# Patient Record
Sex: MALE | Race: BLACK OR AFRICAN AMERICAN | NOT HISPANIC OR LATINO | Employment: OTHER | URBAN - METROPOLITAN AREA
[De-identification: names, ages, dates, MRNs, and addresses within clinical notes are randomized per-mention and may not be internally consistent; named-entity substitution may affect disease eponyms.]

---

## 2023-03-09 DIAGNOSIS — M10.9 PODAGRA: ICD-10-CM

## 2023-03-09 RX ORDER — PROBENECID AND COLCHICINE .5; 5 MG/1; MG/1
TABLET ORAL
COMMUNITY
End: 2023-03-09 | Stop reason: SDUPTHER

## 2023-03-09 RX ORDER — PROBENECID AND COLCHICINE .5; 5 MG/1; MG/1
TABLET ORAL
Qty: 90 TABLET | Refills: 0 | Status: SHIPPED | OUTPATIENT
Start: 2023-03-09 | End: 2023-03-10

## 2023-03-10 DIAGNOSIS — M10.9 PODAGRA: ICD-10-CM

## 2023-03-10 RX ORDER — PROBENECID AND COLCHICINE .5; 5 MG/1; MG/1
TABLET ORAL
Qty: 90 TABLET | Refills: 0 | Status: SHIPPED | OUTPATIENT
Start: 2023-03-10 | End: 2023-06-09

## 2023-03-11 ENCOUNTER — TELEPHONE (OUTPATIENT)
Dept: PRIMARY CARE | Facility: CLINIC | Age: 78
End: 2023-03-11
Payer: MEDICARE

## 2023-03-11 DIAGNOSIS — I10 HYPERTENSION, UNSPECIFIED TYPE: ICD-10-CM

## 2023-03-11 RX ORDER — POTASSIUM CHLORIDE 20 MEQ/1
20 TABLET, EXTENDED RELEASE ORAL 3 TIMES DAILY
Qty: 90 TABLET | Refills: 1 | Status: SHIPPED | OUTPATIENT
Start: 2023-03-11 | End: 2023-06-06

## 2023-03-11 RX ORDER — POTASSIUM CHLORIDE 20 MEQ/1
1 TABLET, EXTENDED RELEASE ORAL 3 TIMES DAILY
COMMUNITY
Start: 2013-08-16 | End: 2023-03-11 | Stop reason: SDUPTHER

## 2023-04-17 ENCOUNTER — APPOINTMENT (OUTPATIENT)
Dept: PRIMARY CARE | Facility: CLINIC | Age: 78
End: 2023-04-17
Payer: MEDICARE

## 2023-05-09 DIAGNOSIS — I10 PRIMARY HYPERTENSION: ICD-10-CM

## 2023-05-09 RX ORDER — METOPROLOL SUCCINATE 200 MG/1
TABLET, EXTENDED RELEASE ORAL
Qty: 90 TABLET | Refills: 0 | OUTPATIENT
Start: 2023-05-09

## 2023-05-17 ENCOUNTER — OFFICE VISIT (OUTPATIENT)
Dept: PRIMARY CARE | Facility: CLINIC | Age: 78
End: 2023-05-17
Payer: MEDICARE

## 2023-05-17 ENCOUNTER — LAB (OUTPATIENT)
Dept: LAB | Facility: LAB | Age: 78
End: 2023-05-17
Payer: MEDICARE

## 2023-05-17 VITALS
HEIGHT: 72 IN | BODY MASS INDEX: 28.85 KG/M2 | HEART RATE: 40 BPM | OXYGEN SATURATION: 98 % | WEIGHT: 213 LBS | DIASTOLIC BLOOD PRESSURE: 66 MMHG | SYSTOLIC BLOOD PRESSURE: 128 MMHG

## 2023-05-17 DIAGNOSIS — N18.32 CKD STAGE G3B/A1, GFR 30-44 AND ALBUMIN CREATININE RATIO <30 MG/G (MULTI): ICD-10-CM

## 2023-05-17 DIAGNOSIS — N40.1 BPH WITH OBSTRUCTION/LOWER URINARY TRACT SYMPTOMS: ICD-10-CM

## 2023-05-17 DIAGNOSIS — R97.20 ELEVATED PSA: ICD-10-CM

## 2023-05-17 DIAGNOSIS — E83.52 HYPERCALCEMIA: ICD-10-CM

## 2023-05-17 DIAGNOSIS — I63.9 CEREBROVASCULAR ACCIDENT (CVA), UNSPECIFIED MECHANISM (MULTI): ICD-10-CM

## 2023-05-17 DIAGNOSIS — N13.8 BPH WITH OBSTRUCTION/LOWER URINARY TRACT SYMPTOMS: ICD-10-CM

## 2023-05-17 DIAGNOSIS — I10 PRIMARY HYPERTENSION: ICD-10-CM

## 2023-05-17 DIAGNOSIS — Z00.00 ROUTINE GENERAL MEDICAL EXAMINATION AT HEALTH CARE FACILITY: ICD-10-CM

## 2023-05-17 DIAGNOSIS — Z00.00 ROUTINE GENERAL MEDICAL EXAMINATION AT HEALTH CARE FACILITY: Primary | ICD-10-CM

## 2023-05-17 PROBLEM — M10.9 GOUT: Status: ACTIVE | Noted: 2023-05-17

## 2023-05-17 PROBLEM — E78.2 COMBINED HYPERLIPIDEMIA: Status: ACTIVE | Noted: 2023-05-17

## 2023-05-17 PROBLEM — H91.90 HEARING LOSS: Status: ACTIVE | Noted: 2023-05-17

## 2023-05-17 LAB
ALBUMIN (MG/L) IN URINE: 7 MG/L
ALBUMIN/CREATININE (UG/MG) IN URINE: 4.4 UG/MG CRT (ref 0–30)
CREATININE (MG/DL) IN URINE: 158 MG/DL (ref 20–370)
PARATHYRIN INTACT (PG/ML) IN SER/PLAS: 265.5 PG/ML (ref 18.5–88)
POC CALCIUM IONIZED (MMOL/L) IN BLOOD: 1.42 MMOL/L (ref 1.1–1.33)

## 2023-05-17 PROCEDURE — 80061 LIPID PANEL: CPT

## 2023-05-17 PROCEDURE — 85027 COMPLETE CBC AUTOMATED: CPT

## 2023-05-17 PROCEDURE — 3074F SYST BP LT 130 MM HG: CPT | Performed by: INTERNAL MEDICINE

## 2023-05-17 PROCEDURE — 80053 COMPREHEN METABOLIC PANEL: CPT

## 2023-05-17 PROCEDURE — 83970 ASSAY OF PARATHORMONE: CPT

## 2023-05-17 PROCEDURE — 82330 ASSAY OF CALCIUM: CPT

## 2023-05-17 PROCEDURE — 99213 OFFICE O/P EST LOW 20 MIN: CPT | Performed by: INTERNAL MEDICINE

## 2023-05-17 PROCEDURE — 82570 ASSAY OF URINE CREATININE: CPT

## 2023-05-17 PROCEDURE — 84153 ASSAY OF PSA TOTAL: CPT

## 2023-05-17 PROCEDURE — 3078F DIAST BP <80 MM HG: CPT | Performed by: INTERNAL MEDICINE

## 2023-05-17 PROCEDURE — G0439 PPPS, SUBSEQ VISIT: HCPCS | Performed by: INTERNAL MEDICINE

## 2023-05-17 PROCEDURE — 82043 UR ALBUMIN QUANTITATIVE: CPT

## 2023-05-17 PROCEDURE — 1160F RVW MEDS BY RX/DR IN RCRD: CPT | Performed by: INTERNAL MEDICINE

## 2023-05-17 PROCEDURE — 1170F FXNL STATUS ASSESSED: CPT | Performed by: INTERNAL MEDICINE

## 2023-05-17 PROCEDURE — 1159F MED LIST DOCD IN RCRD: CPT | Performed by: INTERNAL MEDICINE

## 2023-05-17 PROCEDURE — 36415 COLL VENOUS BLD VENIPUNCTURE: CPT

## 2023-05-17 PROCEDURE — 1036F TOBACCO NON-USER: CPT | Performed by: INTERNAL MEDICINE

## 2023-05-17 PROCEDURE — 84550 ASSAY OF BLOOD/URIC ACID: CPT

## 2023-05-17 RX ORDER — AMLODIPINE BESYLATE 10 MG/1
10 TABLET ORAL DAILY
COMMUNITY
End: 2023-05-23

## 2023-05-17 RX ORDER — LOSARTAN POTASSIUM 100 MG/1
100 TABLET ORAL DAILY
COMMUNITY
End: 2023-06-06

## 2023-05-17 RX ORDER — ALENDRONATE SODIUM 70 MG/1
70 TABLET ORAL
Qty: 4 TABLET | Refills: 11 | Status: SHIPPED | OUTPATIENT
Start: 2023-05-17 | End: 2024-05-16

## 2023-05-17 RX ORDER — FUROSEMIDE 20 MG/1
20 TABLET ORAL DAILY
Qty: 30 TABLET | Refills: 11 | Status: SHIPPED | OUTPATIENT
Start: 2023-05-17 | End: 2024-04-30

## 2023-05-17 RX ORDER — ASPIRIN 81 MG/1
1 TABLET ORAL DAILY
COMMUNITY
Start: 2014-01-17

## 2023-05-17 RX ORDER — CLONIDINE HYDROCHLORIDE 0.2 MG/1
0.2 TABLET ORAL EVERY 12 HOURS
COMMUNITY
End: 2023-06-06

## 2023-05-17 RX ORDER — HYDROCHLOROTHIAZIDE 50 MG/1
50 TABLET ORAL DAILY
COMMUNITY
End: 2023-05-17 | Stop reason: ALTCHOICE

## 2023-05-17 ASSESSMENT — PATIENT HEALTH QUESTIONNAIRE - PHQ9
SUM OF ALL RESPONSES TO PHQ9 QUESTIONS 1 AND 2: 0
2. FEELING DOWN, DEPRESSED OR HOPELESS: NOT AT ALL
1. LITTLE INTEREST OR PLEASURE IN DOING THINGS: NOT AT ALL
2. FEELING DOWN, DEPRESSED OR HOPELESS: NOT AT ALL
SUM OF ALL RESPONSES TO PHQ9 QUESTIONS 1 AND 2: 0
1. LITTLE INTEREST OR PLEASURE IN DOING THINGS: NOT AT ALL

## 2023-05-17 ASSESSMENT — ENCOUNTER SYMPTOMS
NUMBNESS: 0
FACIAL ASYMMETRY: 0
CONFUSION: 0
POLYPHAGIA: 0
NERVOUS/ANXIOUS: 0
EYE DISCHARGE: 0
AGITATION: 0
HYPERACTIVE: 0
BLOOD IN STOOL: 0
CHOKING: 0
FACIAL SWELLING: 0
SHORTNESS OF BREATH: 0
POLYDIPSIA: 0
EYE PAIN: 0
DIAPHORESIS: 0
JOINT SWELLING: 0
COUGH: 0
NAUSEA: 0
HEADACHES: 0
ACTIVITY CHANGE: 0
ABDOMINAL PAIN: 0
DYSPHORIC MOOD: 0
EYE ITCHING: 0
VOMITING: 0
SPEECH DIFFICULTY: 0
DIFFICULTY URINATING: 0
LIGHT-HEADEDNESS: 0
BACK PAIN: 0
EYE REDNESS: 0
FEVER: 0
CHEST TIGHTNESS: 0
APPETITE CHANGE: 0
FATIGUE: 0
ARTHRALGIAS: 0
ABDOMINAL DISTENTION: 0

## 2023-05-17 ASSESSMENT — ACTIVITIES OF DAILY LIVING (ADL)
BATHING: INDEPENDENT
MANAGING_FINANCES: INDEPENDENT
DRESSING: INDEPENDENT
TAKING_MEDICATION: INDEPENDENT
GROCERY_SHOPPING: INDEPENDENT
DOING_HOUSEWORK: INDEPENDENT

## 2023-05-17 NOTE — PROGRESS NOTES
Subjective   Reason for Visit: Huseyin Saunders is an 78 y.o. male here for a Medicare Wellness visit.               HPI    Patient is a 78-year-old male with past medical history of BPH elevated PSA chronic kidney disease stage III hypertension external vascular accident dyslipidemia and likely primary hyperparathyroidism who presents for Medicare wellness.    With regards to his medical care patient is happy with where he is at and does not wish to escalate care.  He is aware of the fact that his calcium has been elevated historically and he has been referred to an endocrinologist for evaluation.  He may need surgical excision of his parathyroid glands however he is deferring that at this time.  On arrival he is bradycardic at 35-40 although he is currently asymptomatic.  He denies any lightheadedness or dizziness.  No recent falls.  He is not on any beta-blocker .    With regards to his gout he has had no recent gout flares.    BPH due for PSA recheck.  He is not on any BPH medications.    He lives at home with his wife.  He does not wish to escalate care at this    Patient Care Team:  Kenji Morris DO as PCP - General  Kenji Morris DO as PCP [facility]     Review of Systems   Constitutional:  Negative for activity change, appetite change, diaphoresis, fatigue and fever.   HENT:  Negative for dental problem, drooling, ear pain, facial swelling, hearing loss and nosebleeds.    Eyes:  Negative for pain, discharge, redness and itching.   Respiratory:  Negative for cough, choking, chest tightness and shortness of breath.    Gastrointestinal:  Negative for abdominal distention, abdominal pain, blood in stool, nausea and vomiting.   Endocrine: Negative for cold intolerance, heat intolerance, polydipsia and polyphagia.   Genitourinary:  Negative for difficulty urinating.   Musculoskeletal:  Negative for arthralgias, back pain and joint swelling.   Allergic/Immunologic: Negative for environmental  allergies and food allergies.   Neurological:  Negative for facial asymmetry, speech difficulty, light-headedness, numbness and headaches.   Psychiatric/Behavioral:  Negative for agitation, confusion and dysphoric mood. The patient is not nervous/anxious and is not hyperactive.        Objective   Vitals:  /66   Pulse (!) 40   Ht 1.829 m (6')   Wt 96.6 kg (213 lb)   SpO2 98%   BMI 28.89 kg/m²       Physical Exam  Constitutional:       Appearance: Normal appearance.   HENT:      Head: Normocephalic and atraumatic.      Nose: No congestion or rhinorrhea.      Mouth/Throat:      Mouth: Mucous membranes are moist.   Eyes:      Pupils: Pupils are equal, round, and reactive to light.   Cardiovascular:      Rate and Rhythm: Normal rate and regular rhythm.      Pulses: Normal pulses.      Heart sounds: Normal heart sounds. No murmur heard.     No friction rub. No gallop.   Pulmonary:      Effort: Pulmonary effort is normal. No respiratory distress.      Breath sounds: Normal breath sounds. No stridor. No wheezing or rales.   Abdominal:      General: Abdomen is flat. Bowel sounds are normal. There is no distension.      Palpations: Abdomen is soft. There is no mass.      Tenderness: There is no guarding.      Hernia: No hernia is present.   Musculoskeletal:         General: No swelling.      Cervical back: No rigidity.      Right lower leg: No edema.      Left lower leg: No edema.      Comments: Weakness of the left lower extremity in the left upper extremity.  Walking with a cane   Skin:     General: Skin is warm and dry.   Neurological:      General: No focal deficit present.      Mental Status: He is alert and oriented to person, place, and time. Mental status is at baseline.      Motor: No weakness.      Gait: Gait normal.   Psychiatric:         Mood and Affect: Mood normal.         Behavior: Behavior normal.         Thought Content: Thought content normal.         Assessment/Plan   Problem List Items Addressed  This Visit          Nervous    Cerebrovascular accident (CVA) (CMS/HCC)    Current Assessment & Plan     Continue with aspirin  Deferring statin  Check labs including LDL fasting         Relevant Medications    furosemide (Lasix) 20 mg tablet    Other Relevant Orders    CBC    Comprehensive metabolic panel    Lipid Panel    PSA    Calcium, ionized    PTH, intact    Albumin , Urine Random    Uric acid       Circulatory    Hypertension    Current Assessment & Plan     Well-controlled on current medications.  No medication adjustment at this time         Relevant Medications    furosemide (Lasix) 20 mg tablet    Other Relevant Orders    CBC    Comprehensive metabolic panel    Lipid Panel    PSA    Calcium, ionized    PTH, intact    Albumin , Urine Random    Uric acid       Genitourinary    CKD stage G3b/A1, GFR 30-44 and albumin creatinine ratio <30 mg/g    Current Assessment & Plan     Check renal function.  Continue ARB.  Change HCTZ to Lasix           Relevant Medications    furosemide (Lasix) 20 mg tablet    Other Relevant Orders    CBC    Comprehensive metabolic panel    Lipid Panel    PSA    Calcium, ionized    PTH, intact    Albumin , Urine Random    Uric acid    BPH with obstruction/lower urinary tract symptoms    Current Assessment & Plan     No LUTS  Check PSA         Relevant Medications    furosemide (Lasix) 20 mg tablet    Other Relevant Orders    CBC    Comprehensive metabolic panel    Lipid Panel    PSA    Calcium, ionized    PTH, intact    Albumin , Urine Random    Uric acid    Elevated PSA    Current Assessment & Plan     Check PSA         Relevant Medications    furosemide (Lasix) 20 mg tablet    Other Relevant Orders    CBC    Comprehensive metabolic panel    Lipid Panel    PSA    Calcium, ionized    PTH, intact    Albumin , Urine Random    Uric acid       Other    Hypercalcemia    Current Assessment & Plan     Change HCTZ for Lasix  EKG shows significant PVC burden  Advised to establish with  endocrinology to discuss primary hyperparathyroidism in the setting of elevated PTH and calcium; patient refusing  Patient is aware that untreated hyper calcium Edelmira can be dangerous and could possibly cause arrhythmias.  Patient does not wish to seek treatment at this time does not want to see endocrinology or ENT for surgical evaluation  Patient is of sound mind and understands his decision could result in death if his calcium levels elevate to a point to causing         Relevant Medications    furosemide (Lasix) 20 mg tablet    alendronate (Fosamax) 70 mg tablet    Other Relevant Orders    CBC    Comprehensive metabolic panel    Lipid Panel    PSA    Calcium, ionized    PTH, intact    Albumin , Urine Random    Uric acid    Referral to Endocrinology     Other Visit Diagnoses       Routine general medical examination at health care facility    -  Primary    Relevant Medications    furosemide (Lasix) 20 mg tablet    Other Relevant Orders    1 Year Follow Up In [facility] - PCP - Wellness Exam    CBC    Comprehensive metabolic panel    Lipid Panel    PSA    Calcium, ionized    PTH, intact    Albumin , Urine Random    Uric acid

## 2023-05-17 NOTE — ASSESSMENT & PLAN NOTE
Change HCTZ for Lasix  EKG shows significant PVC burden  Advised to establish with endocrinology to discuss primary hyperparathyroidism in the setting of elevated PTH and calcium; patient refusing  Patient is aware that untreated hyper calcium Edelmira can be dangerous and could possibly cause arrhythmias.  Patient does not wish to seek treatment at this time does not want to see endocrinology or ENT for surgical evaluation  Patient is of sound mind and understands his decision could result in death if his calcium levels elevate to a point to causing

## 2023-05-18 LAB
ALANINE AMINOTRANSFERASE (SGPT) (U/L) IN SER/PLAS: 12 U/L (ref 10–52)
ALBUMIN (G/DL) IN SER/PLAS: 4.2 G/DL (ref 3.4–5)
ALKALINE PHOSPHATASE (U/L) IN SER/PLAS: 58 U/L (ref 33–136)
ANION GAP IN SER/PLAS: 15 MMOL/L (ref 10–20)
ASPARTATE AMINOTRANSFERASE (SGOT) (U/L) IN SER/PLAS: 15 U/L (ref 9–39)
BILIRUBIN TOTAL (MG/DL) IN SER/PLAS: 1.1 MG/DL (ref 0–1.2)
CALCIUM (MG/DL) IN SER/PLAS: 11.4 MG/DL (ref 8.6–10.6)
CARBON DIOXIDE, TOTAL (MMOL/L) IN SER/PLAS: 26 MMOL/L (ref 21–32)
CHLORIDE (MMOL/L) IN SER/PLAS: 101 MMOL/L (ref 98–107)
CHOLESTEROL (MG/DL) IN SER/PLAS: 161 MG/DL (ref 0–199)
CHOLESTEROL IN HDL (MG/DL) IN SER/PLAS: 31.4 MG/DL
CHOLESTEROL/HDL RATIO: 5.1
CREATININE (MG/DL) IN SER/PLAS: 1.53 MG/DL (ref 0.5–1.3)
ERYTHROCYTE DISTRIBUTION WIDTH (RATIO) BY AUTOMATED COUNT: 12.9 % (ref 11.5–14.5)
ERYTHROCYTE MEAN CORPUSCULAR HEMOGLOBIN CONCENTRATION (G/DL) BY AUTOMATED: 33.5 G/DL (ref 32–36)
ERYTHROCYTE MEAN CORPUSCULAR VOLUME (FL) BY AUTOMATED COUNT: 88 FL (ref 80–100)
ERYTHROCYTES (10*6/UL) IN BLOOD BY AUTOMATED COUNT: 5.46 X10E12/L (ref 4.5–5.9)
GFR MALE: 47 ML/MIN/1.73M2
GLUCOSE (MG/DL) IN SER/PLAS: 115 MG/DL (ref 74–99)
HEMATOCRIT (%) IN BLOOD BY AUTOMATED COUNT: 48.3 % (ref 41–52)
HEMOGLOBIN (G/DL) IN BLOOD: 16.2 G/DL (ref 13.5–17.5)
LDL: 90 MG/DL (ref 0–99)
LEUKOCYTES (10*3/UL) IN BLOOD BY AUTOMATED COUNT: 6.8 X10E9/L (ref 4.4–11.3)
NON HDL CHOLESTEROL: 130 MG/DL
NRBC (PER 100 WBCS) BY AUTOMATED COUNT: 0 /100 WBC (ref 0–0)
PLATELETS (10*3/UL) IN BLOOD AUTOMATED COUNT: 209 X10E9/L (ref 150–450)
POTASSIUM (MMOL/L) IN SER/PLAS: 4 MMOL/L (ref 3.5–5.3)
PROSTATE SPECIFIC AG (NG/ML) IN SER/PLAS: 7.97 NG/ML (ref 0–4)
PROTEIN TOTAL: 6.8 G/DL (ref 6.4–8.2)
SODIUM (MMOL/L) IN SER/PLAS: 138 MMOL/L (ref 136–145)
TRIGLYCERIDE (MG/DL) IN SER/PLAS: 200 MG/DL (ref 0–149)
URATE (MG/DL) IN SER/PLAS: 6.8 MG/DL (ref 4–7.5)
UREA NITROGEN (MG/DL) IN SER/PLAS: 24 MG/DL (ref 6–23)
VLDL: 40 MG/DL (ref 0–40)

## 2023-05-22 DIAGNOSIS — I10 PRIMARY HYPERTENSION: ICD-10-CM

## 2023-05-23 RX ORDER — AMLODIPINE BESYLATE 10 MG/1
10 TABLET ORAL DAILY
Qty: 90 TABLET | Refills: 1 | Status: SHIPPED | OUTPATIENT
Start: 2023-05-23 | End: 2023-11-14

## 2023-06-05 DIAGNOSIS — I10 HYPERTENSION, UNSPECIFIED TYPE: ICD-10-CM

## 2023-06-06 RX ORDER — LOSARTAN POTASSIUM 100 MG/1
TABLET ORAL
Qty: 90 TABLET | Refills: 0 | Status: SHIPPED | OUTPATIENT
Start: 2023-06-06 | End: 2023-09-18

## 2023-06-06 RX ORDER — POTASSIUM CHLORIDE 20 MEQ/1
TABLET, EXTENDED RELEASE ORAL
Qty: 90 TABLET | Refills: 5 | Status: SHIPPED | OUTPATIENT
Start: 2023-06-06 | End: 2023-12-04

## 2023-06-06 RX ORDER — CLONIDINE HYDROCHLORIDE 0.2 MG/1
TABLET ORAL
Qty: 180 TABLET | Refills: 0 | Status: SHIPPED | OUTPATIENT
Start: 2023-06-06 | End: 2023-06-09

## 2023-06-07 DIAGNOSIS — I10 HYPERTENSION, UNSPECIFIED TYPE: ICD-10-CM

## 2023-06-07 DIAGNOSIS — M10.9 PODAGRA: ICD-10-CM

## 2023-06-09 RX ORDER — CLONIDINE HYDROCHLORIDE 0.2 MG/1
TABLET ORAL
Qty: 180 TABLET | Refills: 0 | Status: SHIPPED | OUTPATIENT
Start: 2023-06-09 | End: 2023-09-06

## 2023-06-09 RX ORDER — PROBENECID AND COLCHICINE .5; 5 MG/1; MG/1
TABLET ORAL
Qty: 90 TABLET | Refills: 0 | Status: SHIPPED | OUTPATIENT
Start: 2023-06-09 | End: 2023-08-31 | Stop reason: SDUPTHER

## 2023-07-28 DIAGNOSIS — I10 PRIMARY HYPERTENSION: ICD-10-CM

## 2023-07-30 RX ORDER — METOPROLOL SUCCINATE 200 MG/1
TABLET, EXTENDED RELEASE ORAL
Qty: 90 TABLET | Refills: 0 | Status: SHIPPED | OUTPATIENT
Start: 2023-07-30 | End: 2023-10-24

## 2023-08-31 DIAGNOSIS — M10.9 PODAGRA: ICD-10-CM

## 2023-09-01 RX ORDER — PROBENECID AND COLCHICINE .5; 5 MG/1; MG/1
1 TABLET ORAL DAILY
Qty: 90 TABLET | Refills: 1 | Status: SHIPPED | OUTPATIENT
Start: 2023-09-01 | End: 2024-03-01 | Stop reason: SDUPTHER

## 2023-09-05 DIAGNOSIS — I10 HYPERTENSION, UNSPECIFIED TYPE: ICD-10-CM

## 2023-09-06 DIAGNOSIS — I10 HYPERTENSION, UNSPECIFIED TYPE: ICD-10-CM

## 2023-09-06 RX ORDER — CLONIDINE HYDROCHLORIDE 0.2 MG/1
0.2 TABLET ORAL EVERY 12 HOURS
Qty: 180 TABLET | Refills: 0 | Status: SHIPPED | OUTPATIENT
Start: 2023-09-06 | End: 2023-09-06

## 2023-09-06 RX ORDER — CLONIDINE HYDROCHLORIDE 0.2 MG/1
0.2 TABLET ORAL EVERY 12 HOURS
Qty: 180 TABLET | Refills: 0 | Status: SHIPPED | OUTPATIENT
Start: 2023-09-06 | End: 2023-11-30 | Stop reason: SDUPTHER

## 2023-09-18 DIAGNOSIS — I10 HYPERTENSION, UNSPECIFIED TYPE: ICD-10-CM

## 2023-09-18 RX ORDER — LOSARTAN POTASSIUM 100 MG/1
TABLET ORAL
Qty: 90 TABLET | Refills: 0 | Status: SHIPPED | OUTPATIENT
Start: 2023-09-18 | End: 2023-12-27

## 2023-09-22 DIAGNOSIS — I10 HYPERTENSION, UNSPECIFIED TYPE: ICD-10-CM

## 2023-09-23 RX ORDER — LOSARTAN POTASSIUM 100 MG/1
TABLET ORAL
Qty: 90 TABLET | Refills: 0 | OUTPATIENT
Start: 2023-09-23

## 2023-09-25 DIAGNOSIS — I10 HYPERTENSION, UNSPECIFIED TYPE: ICD-10-CM

## 2023-09-27 RX ORDER — LOSARTAN POTASSIUM 100 MG/1
TABLET ORAL
Qty: 90 TABLET | Refills: 0 | OUTPATIENT
Start: 2023-09-27

## 2023-10-23 DIAGNOSIS — I10 PRIMARY HYPERTENSION: ICD-10-CM

## 2023-10-24 RX ORDER — METOPROLOL SUCCINATE 200 MG/1
200 TABLET, EXTENDED RELEASE ORAL NIGHTLY
Qty: 90 TABLET | Refills: 0 | Status: SHIPPED | OUTPATIENT
Start: 2023-10-24 | End: 2024-01-25

## 2023-10-26 DIAGNOSIS — I10 PRIMARY HYPERTENSION: ICD-10-CM

## 2023-10-26 RX ORDER — METOPROLOL SUCCINATE 200 MG/1
200 TABLET, EXTENDED RELEASE ORAL NIGHTLY
Qty: 90 TABLET | Refills: 0 | OUTPATIENT
Start: 2023-10-26

## 2023-11-13 DIAGNOSIS — I10 PRIMARY HYPERTENSION: ICD-10-CM

## 2023-11-14 RX ORDER — AMLODIPINE BESYLATE 10 MG/1
10 TABLET ORAL DAILY
Qty: 90 TABLET | Refills: 0 | Status: SHIPPED | OUTPATIENT
Start: 2023-11-14 | End: 2024-02-15

## 2023-11-20 ENCOUNTER — APPOINTMENT (OUTPATIENT)
Dept: PRIMARY CARE | Facility: CLINIC | Age: 78
End: 2023-11-20
Payer: MEDICARE

## 2023-11-30 ENCOUNTER — LAB (OUTPATIENT)
Dept: LAB | Facility: LAB | Age: 78
End: 2023-11-30
Payer: MEDICARE

## 2023-11-30 ENCOUNTER — OFFICE VISIT (OUTPATIENT)
Dept: PRIMARY CARE | Facility: CLINIC | Age: 78
End: 2023-11-30
Payer: MEDICARE

## 2023-11-30 VITALS
SYSTOLIC BLOOD PRESSURE: 116 MMHG | HEART RATE: 62 BPM | DIASTOLIC BLOOD PRESSURE: 79 MMHG | WEIGHT: 210 LBS | BODY MASS INDEX: 28.48 KG/M2

## 2023-11-30 DIAGNOSIS — I10 HYPERTENSION, UNSPECIFIED TYPE: ICD-10-CM

## 2023-11-30 DIAGNOSIS — E78.2 COMBINED HYPERLIPIDEMIA: Primary | ICD-10-CM

## 2023-11-30 DIAGNOSIS — E83.52 HYPERCALCEMIA: ICD-10-CM

## 2023-11-30 DIAGNOSIS — Z00.00 ROUTINE GENERAL MEDICAL EXAMINATION AT HEALTH CARE FACILITY: ICD-10-CM

## 2023-11-30 DIAGNOSIS — E21.3 HYPERPARATHYROIDISM (MULTI): ICD-10-CM

## 2023-11-30 DIAGNOSIS — N18.32 CKD STAGE G3B/A1, GFR 30-44 AND ALBUMIN CREATININE RATIO <30 MG/G (MULTI): ICD-10-CM

## 2023-11-30 DIAGNOSIS — I69.351 HEMIPLEGIA AND HEMIPARESIS FOLLOWING CEREBRAL INFARCTION AFFECTING RIGHT DOMINANT SIDE (MULTI): ICD-10-CM

## 2023-11-30 DIAGNOSIS — I10 PRIMARY HYPERTENSION: ICD-10-CM

## 2023-11-30 DIAGNOSIS — R97.20 ELEVATED PSA: ICD-10-CM

## 2023-11-30 PROBLEM — I63.9 CEREBROVASCULAR ACCIDENT (CVA) (MULTI): Status: RESOLVED | Noted: 2023-05-17 | Resolved: 2023-11-30

## 2023-11-30 PROBLEM — N18.31 BENIGN HYPERTENSION WITH STAGE 3A CHRONIC KIDNEY DISEASE (MULTI): Status: ACTIVE | Noted: 2023-11-30

## 2023-11-30 PROBLEM — I12.9 BENIGN HYPERTENSION WITH STAGE 3A CHRONIC KIDNEY DISEASE (MULTI): Status: ACTIVE | Noted: 2023-11-30

## 2023-11-30 LAB
CA-I BLD-SCNC: 1.42 MMOL/L (ref 1.1–1.33)
PSA SERPL-MCNC: 8.12 NG/ML
PTH-INTACT SERPL-MCNC: 263.2 PG/ML (ref 18.5–88)

## 2023-11-30 PROCEDURE — 84153 ASSAY OF PSA TOTAL: CPT

## 2023-11-30 PROCEDURE — 1036F TOBACCO NON-USER: CPT | Performed by: INTERNAL MEDICINE

## 2023-11-30 PROCEDURE — 1160F RVW MEDS BY RX/DR IN RCRD: CPT | Performed by: INTERNAL MEDICINE

## 2023-11-30 PROCEDURE — 3078F DIAST BP <80 MM HG: CPT | Performed by: INTERNAL MEDICINE

## 2023-11-30 PROCEDURE — 82330 ASSAY OF CALCIUM: CPT

## 2023-11-30 PROCEDURE — 36415 COLL VENOUS BLD VENIPUNCTURE: CPT

## 2023-11-30 PROCEDURE — 1159F MED LIST DOCD IN RCRD: CPT | Performed by: INTERNAL MEDICINE

## 2023-11-30 PROCEDURE — 83970 ASSAY OF PARATHORMONE: CPT

## 2023-11-30 PROCEDURE — 99214 OFFICE O/P EST MOD 30 MIN: CPT | Performed by: INTERNAL MEDICINE

## 2023-11-30 PROCEDURE — 3074F SYST BP LT 130 MM HG: CPT | Performed by: INTERNAL MEDICINE

## 2023-11-30 RX ORDER — CLONIDINE HYDROCHLORIDE 0.2 MG/1
0.2 TABLET ORAL EVERY 12 HOURS
Qty: 180 TABLET | Refills: 1 | Status: SHIPPED | OUTPATIENT
Start: 2023-11-30 | End: 2024-05-15

## 2023-11-30 ASSESSMENT — ENCOUNTER SYMPTOMS
SPEECH DIFFICULTY: 0
POLYDIPSIA: 0
ARTHRALGIAS: 0
POLYPHAGIA: 0
NERVOUS/ANXIOUS: 0
BACK PAIN: 0
SHORTNESS OF BREATH: 0
HEADACHES: 0
CHEST TIGHTNESS: 0
CONFUSION: 0
CHOKING: 0
FEVER: 0
ABDOMINAL DISTENTION: 0
BLOOD IN STOOL: 0
FACIAL ASYMMETRY: 0
EYE PAIN: 0
FATIGUE: 0
COUGH: 0
HYPERACTIVE: 0
JOINT SWELLING: 0
NAUSEA: 0
NUMBNESS: 0
FACIAL SWELLING: 0
EYE REDNESS: 0
ABDOMINAL PAIN: 0
APPETITE CHANGE: 0
AGITATION: 0
DIAPHORESIS: 0
EYE ITCHING: 0
VOMITING: 0
LIGHT-HEADEDNESS: 0
DYSPHORIC MOOD: 0
ACTIVITY CHANGE: 0
EYE DISCHARGE: 0
DIFFICULTY URINATING: 0

## 2023-11-30 NOTE — ASSESSMENT & PLAN NOTE
Check PTH and ionized calcium.  Deferring referral to ENT or endocrinology.  Continue alendronate and Lasix

## 2023-12-01 RX ORDER — CLONIDINE HYDROCHLORIDE 0.2 MG/1
0.2 TABLET ORAL EVERY 12 HOURS
Qty: 180 TABLET | Refills: 0 | OUTPATIENT
Start: 2023-12-01

## 2023-12-01 NOTE — RESULT ENCOUNTER NOTE
As discussed previously your PTH and calcium are elevated and I do recommend either seeing an endocrinologist or an ear nose and throat specialist.  If you change your mind please let me know so we can place the referral.  Your prostate-specific antigen is stable at 8.12.  We will continue to check every 6 months.  If it continues to rise we may need to see a urologist.

## 2023-12-02 DIAGNOSIS — I10 HYPERTENSION, UNSPECIFIED TYPE: ICD-10-CM

## 2023-12-04 RX ORDER — POTASSIUM CHLORIDE 20 MEQ/1
TABLET, EXTENDED RELEASE ORAL
Qty: 90 TABLET | Refills: 5 | Status: SHIPPED | OUTPATIENT
Start: 2023-12-04 | End: 2024-06-04

## 2023-12-26 DIAGNOSIS — I10 HYPERTENSION, UNSPECIFIED TYPE: ICD-10-CM

## 2023-12-27 RX ORDER — LOSARTAN POTASSIUM 100 MG/1
TABLET ORAL
Qty: 90 TABLET | Refills: 0 | Status: SHIPPED | OUTPATIENT
Start: 2023-12-27 | End: 2024-03-19

## 2023-12-29 DIAGNOSIS — I10 HYPERTENSION, UNSPECIFIED TYPE: ICD-10-CM

## 2024-01-02 RX ORDER — LOSARTAN POTASSIUM 100 MG/1
TABLET ORAL
Qty: 90 TABLET | Refills: 0 | OUTPATIENT
Start: 2024-01-02

## 2024-01-09 ENCOUNTER — APPOINTMENT (OUTPATIENT)
Dept: RADIOLOGY | Facility: HOSPITAL | Age: 79
End: 2024-01-09
Payer: MEDICARE

## 2024-01-09 ENCOUNTER — HOSPITAL ENCOUNTER (EMERGENCY)
Facility: HOSPITAL | Age: 79
Discharge: HOME | End: 2024-01-09
Payer: MEDICARE

## 2024-01-09 VITALS
BODY MASS INDEX: 32.96 KG/M2 | OXYGEN SATURATION: 100 % | HEART RATE: 49 BPM | WEIGHT: 210 LBS | SYSTOLIC BLOOD PRESSURE: 145 MMHG | TEMPERATURE: 97.9 F | HEIGHT: 67 IN | DIASTOLIC BLOOD PRESSURE: 95 MMHG | RESPIRATION RATE: 16 BRPM

## 2024-01-09 DIAGNOSIS — M79.605 PAIN OF LEFT LOWER EXTREMITY: Primary | ICD-10-CM

## 2024-01-09 PROCEDURE — 2500000004 HC RX 250 GENERAL PHARMACY W/ HCPCS (ALT 636 FOR OP/ED): Performed by: PHYSICIAN ASSISTANT

## 2024-01-09 PROCEDURE — 73564 X-RAY EXAM KNEE 4 OR MORE: CPT | Mod: LT

## 2024-01-09 PROCEDURE — 99284 EMERGENCY DEPT VISIT MOD MDM: CPT

## 2024-01-09 PROCEDURE — 99285 EMERGENCY DEPT VISIT HI MDM: CPT | Performed by: PHYSICIAN ASSISTANT

## 2024-01-09 PROCEDURE — 73590 X-RAY EXAM OF LOWER LEG: CPT | Mod: LT

## 2024-01-09 PROCEDURE — 73564 X-RAY EXAM KNEE 4 OR MORE: CPT | Mod: LEFT SIDE | Performed by: RADIOLOGY

## 2024-01-09 PROCEDURE — 73590 X-RAY EXAM OF LOWER LEG: CPT | Mod: LEFT SIDE | Performed by: RADIOLOGY

## 2024-01-09 PROCEDURE — 96372 THER/PROPH/DIAG INJ SC/IM: CPT | Performed by: PHYSICIAN ASSISTANT

## 2024-01-09 PROCEDURE — 73552 X-RAY EXAM OF FEMUR 2/>: CPT | Mod: LT

## 2024-01-09 PROCEDURE — 73552 X-RAY EXAM OF FEMUR 2/>: CPT | Mod: LEFT SIDE | Performed by: RADIOLOGY

## 2024-01-09 RX ORDER — KETOROLAC TROMETHAMINE 30 MG/ML
30 INJECTION, SOLUTION INTRAMUSCULAR; INTRAVENOUS ONCE
Status: COMPLETED | OUTPATIENT
Start: 2024-01-09 | End: 2024-01-09

## 2024-01-09 RX ORDER — DICLOFENAC SODIUM 10 MG/G
4 GEL TOPICAL 4 TIMES DAILY PRN
Qty: 100 G | Refills: 0 | Status: SHIPPED | OUTPATIENT
Start: 2024-01-09

## 2024-01-09 RX ORDER — ACETAMINOPHEN 500 MG
500-1000 TABLET ORAL EVERY 6 HOURS PRN
Qty: 30 TABLET | Refills: 0 | Status: SHIPPED | OUTPATIENT
Start: 2024-01-09 | End: 2024-01-19

## 2024-01-09 RX ADMIN — KETOROLAC TROMETHAMINE 30 MG: 30 INJECTION, SOLUTION INTRAMUSCULAR; INTRAVENOUS at 09:56

## 2024-01-09 ASSESSMENT — PAIN DESCRIPTION - LOCATION: LOCATION: LEG

## 2024-01-09 NOTE — ED PROVIDER NOTES
HPI   Chief Complaint   Patient presents with    Leg Pain       This is a 78-year-old male with a past medical history of hyperparathyroidism, hyperlipidemia, gout, hemiplegia/hemiparesis secondary to prior CVA, hypertension, CKD presents to the emergency department with concern for left leg pain.  States he normally walks with a cane, sat up on bed about a week ago and states the blanket slid down and he fell against his left side.  Same thing occurred today.  No head trauma both cases.  No loss of consciousness.  Is endorsing pain in the left upper and left lower leg that is mostly present when he gets up and ambulates.  Lives with his wife in a one-story building.  Denies associated chest pain, palpitations, shortness of breath, dizziness, lightheadedness, nausea, vomiting.              Anne Coma Scale Score: 15                  Patient History   Past Medical History:   Diagnosis Date    Personal history of other diseases of the circulatory system     History of essential hypertension     No past surgical history on file.  No family history on file.  Social History     Tobacco Use    Smoking status: Former     Types: Cigarettes     Passive exposure: Past    Smokeless tobacco: Never   Substance Use Topics    Alcohol use: Not Currently    Drug use: Never       Physical Exam   ED Triage Vitals [01/09/24 0901]   Temp Heart Rate Resp BP   36.6 °C (97.9 °F) (!) 49 16 (!) 145/95      SpO2 Temp src Heart Rate Source Patient Position   100 % -- -- --      BP Location FiO2 (%)     -- --       Physical Exam  Vitals and nursing note reviewed.   Constitutional:       Appearance: Normal appearance.   HENT:      Head: Normocephalic and atraumatic.   Cardiovascular:      Rate and Rhythm: Normal rate and regular rhythm.   Pulmonary:      Effort: Pulmonary effort is normal.      Breath sounds: Normal breath sounds.   Musculoskeletal:      Comments: Full range of motion of bilateral hips, knees, ankles, nontender, no swelling,  no erythema, neurovascularly intact   Skin:     General: Skin is warm and dry.   Neurological:      Mental Status: He is alert and oriented to person, place, and time.         ED Course & MDM   Diagnoses as of 01/09/24 1728   Pain of left lower extremity       Medical Decision Making  78-year-old male, is alert and oriented x 3, afebrile and hemodynamically stable.  Presenting with concern for left leg pain status post fall.    Examination with preserved motor, sensation, pulses, skin exam without evidence of any injuries, no edema.  The patient was able to stand independently, but had to hold onto the wheelchair and when it is unable to take a step.    The patient was given a dose of IM Toradol and an x-ray of the left leg including the femur, knee, tibia/fibula was performed.    X-ray without acute findings.  He does report improvement after Toradol.  Was able to stand and take a few steps, though continues to have some pain and weakness.  Low concerns for acute stroke, DVT, arterial thromboembolism given absence of swelling and with palpable pulses.    I offered the patient admission for PT/OT/possible placement versus initiation of home health aide/home PT versus discharge home with neither the above.  The patient and his wife were open to getting set up with home health aide/PT.    Social work was consulted, we put in a prescription for a rollator and order put in for home health aide/PT initiation.  The patient was discharged home, PCP follow-up advised.        Procedure  Procedures     Paulie Avila PA-C  01/09/24 1731

## 2024-01-09 NOTE — ED TRIAGE NOTES
Pt reports he fell a week ago and again this morning. States he has left leg pain and is unable to ambulate

## 2024-01-11 ENCOUNTER — HOME HEALTH ADMISSION (OUTPATIENT)
Dept: HOME HEALTH SERVICES | Facility: HOME HEALTH | Age: 79
End: 2024-01-11
Payer: MEDICARE

## 2024-01-11 ENCOUNTER — DOCUMENTATION (OUTPATIENT)
Dept: HOME HEALTH SERVICES | Facility: HOME HEALTH | Age: 79
End: 2024-01-11
Payer: MEDICARE

## 2024-01-11 ENCOUNTER — TELEPHONE (OUTPATIENT)
Dept: HOME HEALTH SERVICES | Facility: HOME HEALTH | Age: 79
End: 2024-01-11
Payer: MEDICARE

## 2024-01-11 PROBLEM — M79.605 PAIN OF LEFT LOWER EXTREMITY: Status: ACTIVE | Noted: 2024-01-11

## 2024-01-11 NOTE — TELEPHONE ENCOUNTER
Hello,    As discussed in secure chat- okay with DELAY SOC for 1/14/24 pending insurance verification/acceptance.    Thank you!     Betsey Farah LPN  [facility]  [facility]  [address] [place], [place] [id number] [phone]

## 2024-01-14 ENCOUNTER — HOME CARE VISIT (OUTPATIENT)
Dept: HOME HEALTH SERVICES | Facility: HOME HEALTH | Age: 79
End: 2024-01-14
Payer: MEDICARE

## 2024-01-14 VITALS
SYSTOLIC BLOOD PRESSURE: 152 MMHG | DIASTOLIC BLOOD PRESSURE: 97 MMHG | RESPIRATION RATE: 18 BRPM | OXYGEN SATURATION: 100 % | HEART RATE: 54 BPM | TEMPERATURE: 96.7 F

## 2024-01-14 PROCEDURE — G0151 HHCP-SERV OF PT,EA 15 MIN: HCPCS | Mod: HHH

## 2024-01-14 PROCEDURE — 1090000002 HH PPS REVENUE DEBIT

## 2024-01-14 PROCEDURE — 1090000001 HH PPS REVENUE CREDIT

## 2024-01-14 PROCEDURE — 169592 NO-PAY CLAIM PROCEDURE

## 2024-01-14 PROCEDURE — 0023 HH SOC

## 2024-01-14 SDOH — HEALTH STABILITY: PHYSICAL HEALTH: EXERCISE TYPE: SKILLLED THERAPEUTIC EXERCISES

## 2024-01-14 SDOH — HEALTH STABILITY: PHYSICAL HEALTH: EXERCISE ACTIVITY: GAIT TRAINING

## 2024-01-14 SDOH — HEALTH STABILITY: PHYSICAL HEALTH: PHYSICAL EXERCISE: 10

## 2024-01-14 SDOH — HEALTH STABILITY: PHYSICAL HEALTH: EXERCISE ACTIVITIES SETS: 2

## 2024-01-14 SDOH — HEALTH STABILITY: PHYSICAL HEALTH: RESISTANCE: AS TOLERATED

## 2024-01-14 SDOH — HEALTH STABILITY: PHYSICAL HEALTH: EXERCISE ACTIVITY: BALANCE TRAINING

## 2024-01-14 SDOH — HEALTH STABILITY: PHYSICAL HEALTH: PHYSICAL EXERCISE: SUPINE, SITTING, STANDING

## 2024-01-14 SDOH — HEALTH STABILITY: PHYSICAL HEALTH: EXERCISE ACTIVITY: SKILLLED THERAPEUTIC EXERCISES

## 2024-01-14 ASSESSMENT — ACTIVITIES OF DAILY LIVING (ADL)
AMBULATION ASSISTANCE: MODERATE ASSIST
AMBULATION ASSISTANCE: 1
ENTERING_EXITING_HOME: MODERATE ASSIST
AMBULATION ASSISTANCE: STAND BY ASSIST
AMBULATION_DISTANCE/DURATION_TOLERATED: 5 STEPS
PHYSICAL_TRANSFERS_DEVICES: BODY SUPPORT
CURRENT_FUNCTION: MODERATE ASSIST
AMBULATION ASSISTANCE ON FLAT SURFACES: 1
PHYSICAL TRANSFERS ASSESSED: 1
OASIS_M1830: 03

## 2024-01-14 ASSESSMENT — BALANCE ASSESSMENTS
IMMEDIATE STANDING BALANCE FIRST 5 SECONDS: 0 - UNSTEADY (STAGGERS, MOVES FEET, TRUNK SWAY)
ARISING SCORE: 1
EYES CLOSED AT MAXIMUM POSITION NUDGED: 0 - UNSTEADY
NUDGED: 0 - BEGINS TO FALL
ATTEMPTS TO ARISE: 1 - ABLE, REQUIRES MORE THAN ONE ATTEMPT
SITTING DOWN: 1 - USES ARMS OR NOT SMOOTH MOTION
TURNING 360 DEGREES STEPS: 0 - DISCONTINUOUS STEPS
ARISES: 1 - ABLE, USES ARMS TO HELP
BALANCE SCORE: 5
NUDGED SCORE: 0
STANDING BALANCE: 1 - STEADY BUT WIDE STANCE AND USES CANE OR OTHER SUPPORT
SITTING BALANCE: 1 - STEADY, SAFE

## 2024-01-14 ASSESSMENT — ENCOUNTER SYMPTOMS
PAIN LOCATION: LEFT LEG
ARTHRALGIAS: 1
PERSON REPORTING PAIN: PATIENT
LIMITED RANGE OF MOTION: 1
MUSCLE WEAKNESS: 1
SUBJECTIVE PAIN PROGRESSION: WAXING AND WANING
HYPERTENSION: 1
HIGHEST PAIN SEVERITY IN PAST 24 HOURS: 10/10
PAIN: 1
PAIN LOCATION - PAIN SEVERITY: 8/10
LOWEST PAIN SEVERITY IN PAST 24 HOURS: 6/10
PAIN SEVERITY GOAL: 2/10

## 2024-01-14 ASSESSMENT — GAIT ASSESSMENTS
GAIT SCORE: 3
TRUNK SCORE: 1
TRUNK: 1 - NO SWAY BUT FLEXION OF KNEES OR BACK OR SPREADS ARMS WHILE WALKING
BALANCE AND GAIT SCORE: 8
PATH: 1 - MILD/MODERATE DEVIATION OR USES WALKING AID
INITIATION OF GAIT IMMEDIATELY AFTER GO: 0 - ANY HESITANCY OR MULTIPLE ATTEMPTS TO START
WALKING STANCE: 0 - HEELS APART
STEP SYMMETRY: 1 - RIGHT AND LEFT STEP LENGTH APPEAR EQUAL
STEP CONTINUITY: 0 - STOPPING OR DISCONTINUITY BETWEEN STEPS
PATH SCORE: 1

## 2024-01-14 NOTE — HOME HEALTH
prlbme with hearing; visipoijn good. no dep.\    no memory issues    demonstarted antaligc gait pattern and not able to walk more thn 5 to 6 ssteps withoutg WW.

## 2024-01-14 NOTE — HOME HEALTH
\GOALS: PATIENT WILL DEMONSTRATE BETTER MOBILITY WITH NO FALLS.          SUBJECTIVE: THE PATIENT REPORTS HE HAD STROKE 10 YEARS AGO. HE USED TO USE CANE TO NAVIGATE HIS HOME. WEAKNESS IN LEFT LEG AND STATED THE LEFT GROSSLY HURTS WHEN WALKING WITH PAIN SCALE 8/10. PATIENT REPORTED DIFFICULTY WITH AMBULATION AND CURRENTLY HE USED WHEELED WALKER. HE STATED HE HAS PROBLEM WITH HEARING.    PATIENTS WIFE REPORTED HE HAD TWO FALLS BEFORE THE FALL INCIDENT FOR WHICH HE WAS ADMITTED TO THE HOSPITAL.          LIVING CONDITION: PATIENT IS CURRENTLY LIVING IN A ONE-STORY BUILDING WITH STAIRS. PATIENT IS CURRENTLY LIVING WITH HIS WIFE.          OBJECTIVE: MANUAL MUSCLE TESTING WAS PERFORMED TO DETERMINE BOTH UE AND LE STRENGTH. PATIENT DEMONSTRATED 4-/5 MUSCLE STRENGTH TO UE'S AND 3+/5 TO LE'S. BALANCE TESTING WAS PERFORMED WHICH SHOWED PATIENT TO BE A HIGH FALL RISK. PATIENT WAS ABLE TO WALK ONLY 3 TO 4 STEPS WITHOUT AD. DEMONSTRATED DIFFICULTY WITH TRANSFERS FROM STS AND GAIT ABNORMALITIES ALONG WITH BALANCE IMPAIRMENT. DEMONSTRATED ANTALGIC GAIT PATTERN.          THERAPEUTIC ACTIVITIES: MMT, TINETTI, MOBILITY, AND GAIT ASSESSMENT COMPLETED          ASSESSMENT: DEMONSTRATED GROSSLY REDUCED STRENGTH AND ENDURANCE SECONDARY TO ASSOCIATED COMORBID CONDITIONS. PATIENT, PRESENTLY, IS A FALL RISK, CURRENTLY AMBULATING WITH WALKER. FALL PRECAUTIONS DISCUSSED.          PLAN: CONSIDER COMORBID FACTORS WHEN IMPLEMENTING POC. CONTINUE WITH GENERAL ENDURANCE AND STRENGTH TRAINING UE'S AND LE'S, GAIT TRAINING ACTIVITIES, BALANCE AND PROPRIOCEPTIVE TRAINING, AND FALL PREVENTION STRATEGIES AS PER PT POC. PROGRESS AS TOLERATED.G

## 2024-01-15 PROCEDURE — 1090000001 HH PPS REVENUE CREDIT

## 2024-01-15 PROCEDURE — 1090000002 HH PPS REVENUE DEBIT

## 2024-01-16 PROCEDURE — 1090000001 HH PPS REVENUE CREDIT

## 2024-01-16 PROCEDURE — 1090000002 HH PPS REVENUE DEBIT

## 2024-01-17 PROCEDURE — 1090000001 HH PPS REVENUE CREDIT

## 2024-01-17 PROCEDURE — 1090000002 HH PPS REVENUE DEBIT

## 2024-01-18 ENCOUNTER — OFFICE VISIT (OUTPATIENT)
Dept: PRIMARY CARE | Facility: CLINIC | Age: 79
End: 2024-01-18
Payer: MEDICARE

## 2024-01-18 ENCOUNTER — HOME CARE VISIT (OUTPATIENT)
Dept: HOME HEALTH SERVICES | Facility: HOME HEALTH | Age: 79
End: 2024-01-18
Payer: MEDICARE

## 2024-01-18 VITALS
OXYGEN SATURATION: 97 % | WEIGHT: 213 LBS | DIASTOLIC BLOOD PRESSURE: 86 MMHG | BODY MASS INDEX: 33.36 KG/M2 | HEART RATE: 70 BPM | SYSTOLIC BLOOD PRESSURE: 162 MMHG

## 2024-01-18 VITALS — SYSTOLIC BLOOD PRESSURE: 148 MMHG | HEART RATE: 78 BPM | RESPIRATION RATE: 18 BRPM | DIASTOLIC BLOOD PRESSURE: 80 MMHG

## 2024-01-18 DIAGNOSIS — M79.605 PAIN OF LEFT LOWER EXTREMITY: ICD-10-CM

## 2024-01-18 DIAGNOSIS — M79.605 PAIN IN BOTH LOWER EXTREMITIES: Primary | ICD-10-CM

## 2024-01-18 DIAGNOSIS — E21.3 HYPERPARATHYROIDISM (MULTI): ICD-10-CM

## 2024-01-18 DIAGNOSIS — I69.351 HEMIPLEGIA AND HEMIPARESIS FOLLOWING CEREBRAL INFARCTION AFFECTING RIGHT DOMINANT SIDE (MULTI): ICD-10-CM

## 2024-01-18 DIAGNOSIS — M79.604 PAIN IN BOTH LOWER EXTREMITIES: Primary | ICD-10-CM

## 2024-01-18 DIAGNOSIS — E78.2 COMBINED HYPERLIPIDEMIA: ICD-10-CM

## 2024-01-18 DIAGNOSIS — I12.9 BENIGN HYPERTENSION WITH STAGE 3A CHRONIC KIDNEY DISEASE (MULTI): ICD-10-CM

## 2024-01-18 DIAGNOSIS — N18.32 CKD STAGE G3B/A1, GFR 30-44 AND ALBUMIN CREATININE RATIO <30 MG/G (MULTI): ICD-10-CM

## 2024-01-18 DIAGNOSIS — N18.31 BENIGN HYPERTENSION WITH STAGE 3A CHRONIC KIDNEY DISEASE (MULTI): ICD-10-CM

## 2024-01-18 PROCEDURE — 1090000002 HH PPS REVENUE DEBIT

## 2024-01-18 PROCEDURE — 1159F MED LIST DOCD IN RCRD: CPT | Performed by: INTERNAL MEDICINE

## 2024-01-18 PROCEDURE — 1036F TOBACCO NON-USER: CPT | Performed by: INTERNAL MEDICINE

## 2024-01-18 PROCEDURE — 3079F DIAST BP 80-89 MM HG: CPT | Performed by: INTERNAL MEDICINE

## 2024-01-18 PROCEDURE — G0157 HHC PT ASSISTANT EA 15: HCPCS | Mod: HHH

## 2024-01-18 PROCEDURE — 1160F RVW MEDS BY RX/DR IN RCRD: CPT | Performed by: INTERNAL MEDICINE

## 2024-01-18 PROCEDURE — 3077F SYST BP >= 140 MM HG: CPT | Performed by: INTERNAL MEDICINE

## 2024-01-18 PROCEDURE — 1090000001 HH PPS REVENUE CREDIT

## 2024-01-18 PROCEDURE — 99213 OFFICE O/P EST LOW 20 MIN: CPT | Performed by: INTERNAL MEDICINE

## 2024-01-18 RX ORDER — METHOCARBAMOL 500 MG/1
500 TABLET, FILM COATED ORAL 3 TIMES DAILY PRN
Qty: 21 TABLET | Refills: 0 | Status: SHIPPED | OUTPATIENT
Start: 2024-01-18 | End: 2024-01-25

## 2024-01-18 ASSESSMENT — ENCOUNTER SYMPTOMS
FEVER: 0
AGITATION: 0
CHOKING: 0
ARTHRALGIAS: 0
NUMBNESS: 0
DIAPHORESIS: 0
NAUSEA: 0
FACIAL SWELLING: 0
DYSPHORIC MOOD: 0
EYE ITCHING: 0
LIGHT-HEADEDNESS: 0
ABDOMINAL DISTENTION: 0
CHEST TIGHTNESS: 0
EYE DISCHARGE: 0
DIFFICULTY URINATING: 0
BACK PAIN: 0
NERVOUS/ANXIOUS: 0
EYE PAIN: 0
BLOOD IN STOOL: 0
APPETITE CHANGE: 0
POLYDIPSIA: 0
EYE REDNESS: 0
SPEECH DIFFICULTY: 0
ACTIVITY CHANGE: 0
CONFUSION: 0
ABDOMINAL PAIN: 0
POLYPHAGIA: 0
SHORTNESS OF BREATH: 0
HYPERACTIVE: 0
COUGH: 0
JOINT SWELLING: 0
FACIAL ASYMMETRY: 0
VOMITING: 0
HEADACHES: 0
FATIGUE: 0

## 2024-01-18 ASSESSMENT — PAIN DESCRIPTION - PAIN TYPE: TYPE: ACUTE PAIN

## 2024-01-18 ASSESSMENT — ACTIVITIES OF DAILY LIVING (ADL): EFFECT OF PAIN ON DAILY ACTIVITIES: GAIT DISTURBANCES

## 2024-01-18 NOTE — PROGRESS NOTES
Patient was identified as a fall risk. Risk prevention instructions Subjective   Reason for Visit: Huseyin Saunders is an 78 y.o. male here for a Medicare Wellness visit.               HPI    Patient is a 78-year-old male with past medical history of BPH elevated PSA chronic kidney disease stage III hypertension history of hx of vascular accident with residue hemiplegia on the LLE,  dyslipidemia and likely primary hyperparathyroidism who presents for follow-up n to recent ER visit.  Patient sustained a mechanical fall injuring his left leg.  Images did not show any fracture.  He was discharged with recommendation for home physical therapy.  He has physical therapy scheduled for today.  He does have chronic kidney disease and will tolerate ibuprofen very well.  He is interested in medication to help alleviate some of the pain affecting his left knee that is radiating both up towards the thigh as well as down towards the foot.    Patient likely has primary hyperparathyroidism refuses to see endocrinology or ENT.  He is due for for PTH as well as ionized calcium.  He is on Lasix as well as alendronate.    Hypertension elevated today.  He is not interested in blood pressure medications at this time we will continue current medications    Chronic gout.  No active flare.  Compliant with his daily medications.    With regards to his medical care patient is happy with where he is at and does not wish to escalate care.  He is aware of the fact that his calcium has been elevated historically and he has been referred to an endocrinologist for evaluation.  He may need surgical excision of his parathyroid glands however he is deferring that at this time.        Patient Care Team:  Kenji Morris DO as PCP - General (Internal Medicine)  Kenji Morris DO [facility] PCP [facility]     Review of Systems   Constitutional:  Negative for activity change, appetite change, diaphoresis, fatigue and fever.   HENT:  Negative for  dental problem, drooling, ear pain, facial swelling, hearing loss and nosebleeds.    Eyes:  Negative for pain, discharge, redness and itching.   Respiratory:  Negative for cough, choking, chest tightness and shortness of breath.    Gastrointestinal:  Negative for abdominal distention, abdominal pain, blood in stool, nausea and vomiting.   Endocrine: Negative for cold intolerance, heat intolerance, polydipsia and polyphagia.   Genitourinary:  Negative for difficulty urinating.   Musculoskeletal:  Negative for arthralgias, back pain and joint swelling.   Allergic/Immunologic: Negative for environmental allergies and food allergies.   Neurological:  Negative for facial asymmetry, speech difficulty, light-headedness, numbness and headaches.   Psychiatric/Behavioral:  Negative for agitation, confusion and dysphoric mood. The patient is not nervous/anxious and is not hyperactive.        Objective   Vitals:  /86   Pulse 70   Wt 96.6 kg (213 lb)   SpO2 97%   BMI 33.36 kg/m²       Physical Exam  Constitutional:       Appearance: Normal appearance.   HENT:      Head: Normocephalic and atraumatic.      Nose: No congestion or rhinorrhea.      Mouth/Throat:      Mouth: Mucous membranes are moist.   Eyes:      Pupils: Pupils are equal, round, and reactive to light.   Cardiovascular:      Rate and Rhythm: Normal rate and regular rhythm.      Pulses: Normal pulses.      Heart sounds: Normal heart sounds. No murmur heard.     No friction rub. No gallop.   Pulmonary:      Effort: Pulmonary effort is normal. No respiratory distress.      Breath sounds: Normal breath sounds. No stridor. No wheezing or rales.   Abdominal:      General: Abdomen is flat. Bowel sounds are normal. There is no distension.      Palpations: Abdomen is soft. There is no mass.      Tenderness: There is no guarding.      Hernia: No hernia is present.   Musculoskeletal:         General: No swelling.      Cervical back: No rigidity.      Right lower leg:  No edema.      Left lower leg: No edema.      Comments: Weakness of the left lower extremity in the left upper extremity.  Walking with a cane   Skin:     General: Skin is warm and dry.   Neurological:      General: No focal deficit present.      Mental Status: He is alert and oriented to person, place, and time. Mental status is at baseline.      Motor: No weakness.      Gait: Gait normal.   Psychiatric:         Mood and Affect: Mood normal.         Behavior: Behavior normal.         Thought Content: Thought content normal.         Assessment/Plan   Problem List Items Addressed This Visit       Combined hyperlipidemia    Current Assessment & Plan     Continue current medications and recheck LDL at next visit         Hyperparathyroidism (CMS/[facility])    Current Assessment & Plan     Once again encouraged referral to endocrinology for evaluation however deferring at this time         Hemiplegia and hemiparesis following cerebral infarction affecting right dominant side (CMS/[facility])    Current Assessment & Plan     Advised to continue aspirin and statin.  Stressed the importance of normotension however does not wish to go up on his medications at this time         Benign hypertension with stage 3a chronic kidney disease (CMS/[facility])    Current Assessment & Plan     Encouraged adjustments in medications especially considering chronic kidney disease stage III.  Patient wishes to hold on current medications and will reassess next visit         Relevant Orders    Follow Up In [facility] - PCP - Established    Pain of left lower extremity    Current Assessment & Plan     Patient to have physical therapy today.  Will prescribe muscle relaxer to be taken as needed.  Advised on sedation of the medication and to avoid operating heavy machinery should it cause sedation.  Encourage stretches and exercises.  May take low-dose ibuprofen such as 400 mg only twice a day as needed          Other Visit Diagnoses       Pain in both  lower extremities    -  Primary    Relevant Medications    methocarbamol (Robaxin) 500 mg tablet    CKD stage G3b/A1, GFR 30-44 and albumin creatinine ratio <30 mg/g (CMS/HCC)                 .

## 2024-01-18 NOTE — PATIENT INSTRUCTIONS

## 2024-01-18 NOTE — ASSESSMENT & PLAN NOTE
Advised to continue aspirin and statin.  Stressed the importance of normotension however does not wish to go up on his medications at this time

## 2024-01-18 NOTE — ASSESSMENT & PLAN NOTE
Patient to have physical therapy today.  Will prescribe muscle relaxer to be taken as needed.  Advised on sedation of the medication and to avoid operating heavy machinery should it cause sedation.  Encourage stretches and exercises.  May take low-dose ibuprofen such as 400 mg only twice a day as needed

## 2024-01-18 NOTE — ASSESSMENT & PLAN NOTE
Encouraged adjustments in medications especially considering chronic kidney disease stage III.  Patient wishes to hold on current medications and will reassess next visit

## 2024-01-19 ENCOUNTER — TELEPHONE (OUTPATIENT)
Dept: PRIMARY CARE | Facility: CLINIC | Age: 79
End: 2024-01-19
Payer: MEDICARE

## 2024-01-19 DIAGNOSIS — R26.9 IMPAIRED GAIT: Primary | ICD-10-CM

## 2024-01-19 PROCEDURE — 1090000001 HH PPS REVENUE CREDIT

## 2024-01-19 PROCEDURE — 1090000002 HH PPS REVENUE DEBIT

## 2024-01-20 PROCEDURE — 1090000002 HH PPS REVENUE DEBIT

## 2024-01-20 PROCEDURE — 1090000001 HH PPS REVENUE CREDIT

## 2024-01-21 PROCEDURE — 1090000002 HH PPS REVENUE DEBIT

## 2024-01-21 PROCEDURE — 1090000001 HH PPS REVENUE CREDIT

## 2024-01-22 ENCOUNTER — HOME CARE VISIT (OUTPATIENT)
Dept: HOME HEALTH SERVICES | Facility: HOME HEALTH | Age: 79
End: 2024-01-22
Payer: MEDICARE

## 2024-01-22 VITALS — SYSTOLIC BLOOD PRESSURE: 126 MMHG | RESPIRATION RATE: 18 BRPM | HEART RATE: 66 BPM | DIASTOLIC BLOOD PRESSURE: 72 MMHG

## 2024-01-22 DIAGNOSIS — I69.351 HEMIPLEGIA AND HEMIPARESIS FOLLOWING CEREBRAL INFARCTION AFFECTING RIGHT DOMINANT SIDE (MULTI): Primary | ICD-10-CM

## 2024-01-22 PROCEDURE — 1090000001 HH PPS REVENUE CREDIT

## 2024-01-22 PROCEDURE — G0157 HHC PT ASSISTANT EA 15: HCPCS | Mod: HHH

## 2024-01-22 PROCEDURE — 1090000002 HH PPS REVENUE DEBIT

## 2024-01-22 RX ORDER — CALCIUM CARBONATE 160(400)MG
1 TABLET,CHEWABLE ORAL ONCE
Qty: 1 EACH | Refills: 0 | Status: SHIPPED | OUTPATIENT
Start: 2024-01-22 | End: 2024-01-22

## 2024-01-22 ASSESSMENT — PAIN DESCRIPTION - PAIN TYPE: TYPE: ACUTE PAIN

## 2024-01-23 PROCEDURE — 1090000002 HH PPS REVENUE DEBIT

## 2024-01-23 PROCEDURE — 1090000001 HH PPS REVENUE CREDIT

## 2024-01-24 ENCOUNTER — HOME CARE VISIT (OUTPATIENT)
Dept: HOME HEALTH SERVICES | Facility: HOME HEALTH | Age: 79
End: 2024-01-24
Payer: MEDICARE

## 2024-01-24 VITALS — RESPIRATION RATE: 18 BRPM | SYSTOLIC BLOOD PRESSURE: 130 MMHG | HEART RATE: 72 BPM | DIASTOLIC BLOOD PRESSURE: 80 MMHG

## 2024-01-24 DIAGNOSIS — I10 PRIMARY HYPERTENSION: ICD-10-CM

## 2024-01-24 PROCEDURE — G0157 HHC PT ASSISTANT EA 15: HCPCS | Mod: HHH

## 2024-01-24 PROCEDURE — 1090000001 HH PPS REVENUE CREDIT

## 2024-01-24 PROCEDURE — 1090000002 HH PPS REVENUE DEBIT

## 2024-01-24 ASSESSMENT — PAIN DESCRIPTION - PAIN TYPE: TYPE: ACUTE PAIN

## 2024-01-25 PROCEDURE — 1090000002 HH PPS REVENUE DEBIT

## 2024-01-25 PROCEDURE — 1090000001 HH PPS REVENUE CREDIT

## 2024-01-25 RX ORDER — METOPROLOL SUCCINATE 200 MG/1
200 TABLET, EXTENDED RELEASE ORAL NIGHTLY
Qty: 90 TABLET | Refills: 1 | Status: SHIPPED | OUTPATIENT
Start: 2024-01-25

## 2024-01-26 PROCEDURE — 1090000001 HH PPS REVENUE CREDIT

## 2024-01-26 PROCEDURE — 1090000002 HH PPS REVENUE DEBIT

## 2024-01-27 PROCEDURE — 1090000001 HH PPS REVENUE CREDIT

## 2024-01-27 PROCEDURE — 1090000002 HH PPS REVENUE DEBIT

## 2024-01-28 PROCEDURE — 1090000002 HH PPS REVENUE DEBIT

## 2024-01-28 PROCEDURE — 1090000001 HH PPS REVENUE CREDIT

## 2024-01-29 ENCOUNTER — HOME CARE VISIT (OUTPATIENT)
Dept: HOME HEALTH SERVICES | Facility: HOME HEALTH | Age: 79
End: 2024-01-29
Payer: MEDICARE

## 2024-01-29 PROCEDURE — 1090000002 HH PPS REVENUE DEBIT

## 2024-01-29 PROCEDURE — G0157 HHC PT ASSISTANT EA 15: HCPCS | Mod: HHH

## 2024-01-29 PROCEDURE — 1090000001 HH PPS REVENUE CREDIT

## 2024-01-29 ASSESSMENT — PAIN DESCRIPTION - PAIN TYPE: TYPE: ACUTE PAIN

## 2024-01-29 ASSESSMENT — ACTIVITIES OF DAILY LIVING (ADL): EFFECT OF PAIN ON DAILY ACTIVITIES: ANTALGIC GAIT.

## 2024-01-30 PROCEDURE — 1090000002 HH PPS REVENUE DEBIT

## 2024-01-30 PROCEDURE — 1090000001 HH PPS REVENUE CREDIT

## 2024-01-31 ENCOUNTER — HOME CARE VISIT (OUTPATIENT)
Dept: HOME HEALTH SERVICES | Facility: HOME HEALTH | Age: 79
End: 2024-01-31
Payer: MEDICARE

## 2024-01-31 VITALS — SYSTOLIC BLOOD PRESSURE: 136 MMHG | DIASTOLIC BLOOD PRESSURE: 80 MMHG | HEART RATE: 80 BPM

## 2024-01-31 PROCEDURE — 1090000002 HH PPS REVENUE DEBIT

## 2024-01-31 PROCEDURE — 1090000001 HH PPS REVENUE CREDIT

## 2024-01-31 PROCEDURE — G0157 HHC PT ASSISTANT EA 15: HCPCS | Mod: HHH

## 2024-01-31 ASSESSMENT — ACTIVITIES OF DAILY LIVING (ADL): EFFECT OF PAIN ON DAILY ACTIVITIES: ANTALGIC GAIT

## 2024-01-31 ASSESSMENT — PAIN DESCRIPTION - PAIN TYPE: TYPE: ACUTE PAIN

## 2024-02-01 PROCEDURE — 1090000001 HH PPS REVENUE CREDIT

## 2024-02-01 PROCEDURE — 1090000002 HH PPS REVENUE DEBIT

## 2024-02-02 PROCEDURE — 1090000001 HH PPS REVENUE CREDIT

## 2024-02-02 PROCEDURE — 1090000002 HH PPS REVENUE DEBIT

## 2024-02-03 PROCEDURE — 1090000001 HH PPS REVENUE CREDIT

## 2024-02-03 PROCEDURE — 1090000002 HH PPS REVENUE DEBIT

## 2024-02-04 PROCEDURE — 1090000002 HH PPS REVENUE DEBIT

## 2024-02-04 PROCEDURE — 1090000001 HH PPS REVENUE CREDIT

## 2024-02-05 PROCEDURE — 1090000001 HH PPS REVENUE CREDIT

## 2024-02-05 PROCEDURE — 1090000002 HH PPS REVENUE DEBIT

## 2024-02-06 PROCEDURE — 1090000001 HH PPS REVENUE CREDIT

## 2024-02-06 PROCEDURE — 1090000002 HH PPS REVENUE DEBIT

## 2024-02-07 ENCOUNTER — HOME CARE VISIT (OUTPATIENT)
Dept: HOME HEALTH SERVICES | Facility: HOME HEALTH | Age: 79
End: 2024-02-07
Payer: MEDICARE

## 2024-02-07 PROCEDURE — G0157 HHC PT ASSISTANT EA 15: HCPCS | Mod: HHH

## 2024-02-07 PROCEDURE — 1090000002 HH PPS REVENUE DEBIT

## 2024-02-07 PROCEDURE — 1090000001 HH PPS REVENUE CREDIT

## 2024-02-07 ASSESSMENT — PAIN DESCRIPTION - PAIN TYPE: TYPE: ACUTE PAIN

## 2024-02-08 PROCEDURE — 1090000002 HH PPS REVENUE DEBIT

## 2024-02-08 PROCEDURE — 1090000001 HH PPS REVENUE CREDIT

## 2024-02-09 ENCOUNTER — HOME CARE VISIT (OUTPATIENT)
Dept: HOME HEALTH SERVICES | Facility: HOME HEALTH | Age: 79
End: 2024-02-09
Payer: MEDICARE

## 2024-02-09 PROCEDURE — 1090000001 HH PPS REVENUE CREDIT

## 2024-02-09 PROCEDURE — 1090000002 HH PPS REVENUE DEBIT

## 2024-02-10 PROCEDURE — 1090000002 HH PPS REVENUE DEBIT

## 2024-02-10 PROCEDURE — 1090000001 HH PPS REVENUE CREDIT

## 2024-02-11 PROCEDURE — 1090000002 HH PPS REVENUE DEBIT

## 2024-02-11 PROCEDURE — 1090000001 HH PPS REVENUE CREDIT

## 2024-02-12 PROCEDURE — 1090000001 HH PPS REVENUE CREDIT

## 2024-02-12 PROCEDURE — 1090000002 HH PPS REVENUE DEBIT

## 2024-02-13 ENCOUNTER — HOME CARE VISIT (OUTPATIENT)
Dept: HOME HEALTH SERVICES | Facility: HOME HEALTH | Age: 79
End: 2024-02-13
Payer: MEDICARE

## 2024-02-13 VITALS
DIASTOLIC BLOOD PRESSURE: 88 MMHG | SYSTOLIC BLOOD PRESSURE: 150 MMHG | TEMPERATURE: 97.6 F | HEART RATE: 58 BPM | RESPIRATION RATE: 18 BRPM

## 2024-02-13 PROCEDURE — 1090000001 HH PPS REVENUE CREDIT

## 2024-02-13 PROCEDURE — 0023 HH SOC

## 2024-02-13 PROCEDURE — 1090000003 HH PPS REVENUE ADJ

## 2024-02-13 PROCEDURE — G0151 HHCP-SERV OF PT,EA 15 MIN: HCPCS | Mod: HHH

## 2024-02-13 PROCEDURE — 1090000002 HH PPS REVENUE DEBIT

## 2024-02-13 ASSESSMENT — GAIT ASSESSMENTS
STEP CONTINUITY: 0 - STOPPING OR DISCONTINUITY BETWEEN STEPS
INITIATION OF GAIT IMMEDIATELY AFTER GO: 0 - ANY HESITANCY OR MULTIPLE ATTEMPTS TO START
PATH: 1 - MILD/MODERATE DEVIATION OR USES WALKING AID
TRUNK: 0 - MARKED SWAY OR USES WALKING AID
STEP SYMMETRY: 1 - RIGHT AND LEFT STEP LENGTH APPEAR EQUAL
PATH SCORE: 1
BALANCE AND GAIT SCORE: 19
WALKING STANCE: 0 - HEELS APART
GAIT SCORE: 6
TRUNK SCORE: 0

## 2024-02-13 ASSESSMENT — BALANCE ASSESSMENTS
SITTING DOWN: 1 - USES ARMS OR NOT SMOOTH MOTION
BALANCE SCORE: 13
STANDING BALANCE: 2 - NARROW STANCE WITHOUT SUPPORT
NUDGED: 2 - STEADY
TURNING 360 DEGREES STEPS: 0 - DISCONTINUOUS STEPS
IMMEDIATE STANDING BALANCE FIRST 5 SECONDS: 2 - STEADY WITHOUT WALKER OR OTHER SUPPORT
ARISES: 1 - ABLE, USES ARMS TO HELP
SITTING BALANCE: 1 - STEADY, SAFE
ARISING SCORE: 1
NUDGED SCORE: 2
EYES CLOSED AT MAXIMUM POSITION NUDGED: 1 - STEADY
ATTEMPTS TO ARISE: 2 - ABLE TO RISE, ONE ATTEMPT

## 2024-02-13 ASSESSMENT — ACTIVITIES OF DAILY LIVING (ADL)
HOME_HEALTH_OASIS: 01
OASIS_M1830: 02

## 2024-02-13 NOTE — HOME HEALTH
S: PT reassessment for discharge. Pt being seen for decreased mobility related to CVA.    B: Pt reports he still has difficulty walking and requires use of rollator as opposed to a cane. He reports no falls, no change in medications, and rates current pain level at up to 8/10. Pt has pictoral handouts for HEP.    A: Pt demonstrates good improvement in functional mobility as illustrated by improved gait technique, independence and endurance, and improved Tinetti score from 8/28 on SOC to 19/28 today. Pt ambulates safely and independently using rollator walker within the home. He tends to hyperextend his L knee during stance phase and might benefit from a knee brace, but states he wore one for awhile after the CVA and found that it cut the circulation off in his lower leg. Pt and his wife are concerned with his continued L LE pain and are looking to see a specialist about it, but not sure who to see. PT recommended a neurosurgeon and orthopedist, and issued phone number to call for scheduling. PT also recommended transitioning to outpatient PT to further progress strengthening, gait and balance training. Pt and wife are in agreement with this plan. Pt is comfortable and independent with current HEP and appropriate for discharge from home health PT today.    P: Pt has met goals and is in agreement with discharge from home health PT today.

## 2024-02-13 NOTE — Clinical Note
Pt discharged from home health PT today. Pt is demonstrating improved stability with walking, but is still having pain down his L LE. He is interested in transitioning to outpatient PT to further progress his rehab. Are you able to enter an order for outpatient PT at the [facility] facility?

## 2024-02-15 DIAGNOSIS — I10 PRIMARY HYPERTENSION: ICD-10-CM

## 2024-02-15 RX ORDER — AMLODIPINE BESYLATE 10 MG/1
10 TABLET ORAL DAILY
Qty: 90 TABLET | Refills: 1 | Status: SHIPPED | OUTPATIENT
Start: 2024-02-15

## 2024-02-28 ENCOUNTER — OFFICE VISIT (OUTPATIENT)
Dept: ORTHOPEDIC SURGERY | Facility: CLINIC | Age: 79
End: 2024-02-28
Payer: MEDICARE

## 2024-02-28 DIAGNOSIS — M62.81 MUSCLE WEAKNESS OF LOWER EXTREMITY: ICD-10-CM

## 2024-02-28 DIAGNOSIS — M76.32 ILIOTIBIAL BAND SYNDROME OF LEFT SIDE: ICD-10-CM

## 2024-02-28 PROCEDURE — 1159F MED LIST DOCD IN RCRD: CPT | Performed by: FAMILY MEDICINE

## 2024-02-28 PROCEDURE — 1125F AMNT PAIN NOTED PAIN PRSNT: CPT | Performed by: FAMILY MEDICINE

## 2024-02-28 PROCEDURE — 1036F TOBACCO NON-USER: CPT | Performed by: FAMILY MEDICINE

## 2024-02-28 PROCEDURE — 1123F ACP DISCUSS/DSCN MKR DOCD: CPT | Performed by: FAMILY MEDICINE

## 2024-02-28 PROCEDURE — 1160F RVW MEDS BY RX/DR IN RCRD: CPT | Performed by: FAMILY MEDICINE

## 2024-02-28 PROCEDURE — 99204 OFFICE O/P NEW MOD 45 MIN: CPT | Performed by: FAMILY MEDICINE

## 2024-02-28 NOTE — PROGRESS NOTES
** Please excuse any errors in grammar or translation related to this dictation. Voice recognition software was utilized to prepare this document. **    Assessment & Plan:    Dx: Left leg weakness s/p CVA vs Left IT band syndrome    We discussed that most of his symptoms are likely due to chronic left leg weakness caused by his stroke. This has led to altered biomechanics with ambulation and left IT band syndrome. At this time recommend conservative treatment with physical therapy for leg strengthening, balance, and IT band stretching. Use Voltaren gel up to 3 times daily.  Return as needed.  All questions answered and patient is agreeable to plan of care.    Chief complaint:  Left leg pain    HPI:  79 y/o patient, hx of stroke in 2010 causing left leg weakness and left foot drop, presents with left leg pain.  This complaint has been ongoing for 2 months.  He had a fall from his bed to the floor around that time, unknown mechanism, but is unsure if that caused it. Symptoms have progressively worsened with time.  Pain is most prominent on the lateral aspect of the thigh, sometimes goes past the knee.  Denies numbness/tingling. Symptoms are aggravated by changing positions from sitting to standing, walking. To date, patient has tried home health PT in Jan-Feb 2024, who recommended transition to outpatient PT for continued sterngthening. Denies previous surgery to this site. He was using a brace previously for foot drop but it was cutting off his circulation so he discontinued it.    Exam:  Left hip Exam:  Antalgic, assisted with rollator  No warmth, erythema or ecchymosis overlying.  Active flexion >90 degrees with grossly normal extension, ,abduction, adduction.  Limited IR and ER.  NTTP over greater trochanter, glute tendons, around the knee, tibia or fibula.  TTP over entire length of IT band.   4/5 strength of hip flexion, abduction, & adduction  SILT  [ - ]Log roll pain, [ - ]FADIR pain, [ - ]NAM pain, [ -  ]Murray,  [ - ]Scour [-] Noble test.  [ - ] C-sign  [ - ]Pain with resisted hip flexion with knee flexed, + Alex´s    Results:  X-ray of the left femur from prior was reviewed and personally interpreted, which did not show any acute fracture.  I did show moderate osteoarthritis of the left hip joint.  X-ray of the left knee from prior was reviewed and personally interpreted, which showed medial joint space narrowing and no acute fracture.

## 2024-03-01 DIAGNOSIS — M10.9 PODAGRA: ICD-10-CM

## 2024-03-04 ENCOUNTER — TELEPHONE (OUTPATIENT)
Dept: PRIMARY CARE | Facility: CLINIC | Age: 79
End: 2024-03-04
Payer: MEDICARE

## 2024-03-04 RX ORDER — PROBENECID AND COLCHICINE .5; 5 MG/1; MG/1
1 TABLET ORAL DAILY
Qty: 90 TABLET | Refills: 0 | Status: SHIPPED | OUTPATIENT
Start: 2024-03-04 | End: 2024-05-15

## 2024-03-18 DIAGNOSIS — I10 HYPERTENSION, UNSPECIFIED TYPE: ICD-10-CM

## 2024-03-19 RX ORDER — LOSARTAN POTASSIUM 100 MG/1
100 TABLET ORAL DAILY
Qty: 90 TABLET | Refills: 0 | Status: SHIPPED | OUTPATIENT
Start: 2024-03-19 | End: 2024-06-04

## 2024-03-26 PROBLEM — R26.2 DIFFICULTY IN WALKING INVOLVING LOWER LEG JOINT: Status: ACTIVE | Noted: 2024-03-26

## 2024-03-26 NOTE — PROGRESS NOTES
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 He had been using a cane until fall on 1/9/24. He had home PT and was transitioned to a rollator walker for ambulation safety. He L thigh pain is resolving. His primary problem is secondary to stroke affects of loss of L LE motor control, drop foot, and L knee slapping back into hyperextension  during L stance phase. He needs to bee fitted with  L AFO. I would like the Huseyin Saunders to obtain AFO before the next scheduled PT visit as I believe this is important in helping him establish a more stable gait pattern and reduce L LE stress.    Plan:  OP PT Plan  Treatment/Interventions: Education/ Instruction, Gait training, Neuromuscular re-education, Self care/ home management, Therapeutic activities, Therapeutic exercises, Taping techniques  PT Plan: Skilled PT  PT Frequency: 2 times per week  Duration: 12  Onset Date: 01/09/24  Certification Period Start Date: 03/27/24  Certification Period End Date: 06/25/24  Rehab Potential: Fair    Current Problem:   1. Pain of left lower extremity  Follow Up In Physical Therapy      2. Difficulty in walking involving lower leg joint  Follow Up In Physical Therapy      3. Iliotibial band syndrome of left side  Referral to Physical Therapy    Follow Up In Physical Therapy      4. Muscle weakness of lower extremity  Referral to Physical Therapy    Follow Up In Physical Therapy          Subjective    Huseyin Saunders had an incidence of falling 1/9/24 and aggravated L lateral thigh pain. He had home PT in Feb 2024 and this helped reduce his pain and helped him walk better. He c/o weakness in L and minor L  lateral thigh pain 2/10     Pain:   2/10  Home Living:   Lives at home with wife in 1 story  Prior Level of Function:  Prior Function Per Pt/Caregiver Report  Hand Dominance: RightWesjason Saunders had stroke in 2010 affecting L side. He had used a cane until his difficulties in January. Now uses a rollator walker as advised by home PT. His goal is to walk with a cane. He does not wear L AFO secondary to discomfort from the brace.    Objective   Cognition:   A+Ox3  Observation:      Posture:  genu varus    genu valgus  pelvic obliquity   hyperlordosis    lacks lumbar lordosis    leg length discrepancy    hyperextension                  Intoe          outtoe    Single leg stance:  Eyes open  R  sec   L  sec     unable    Knee ROM: Flexion  AROM  R 120  L 110 deg  , Extension  AROM  R 0 L  0deg       Knee Strength:  Flex  R 5/5   L 3/5,  Ext R  5/5  L 3/5,      Hip ROM:  (AROM/ PROM)  Flex   R 80 /90 deg   L45 / 80 deg   Abduct  R  30    L  20 P    Extend   R  /  L  / : PROM  IR R 10  L 0    ER  R  30 L 20 deg      Hip Strength:  Abduction  R  4/5  L 2 /5                  Flexion R  5/5   L  3-/5    Palpation: minor tenderness to  L lateral greater trochanter to lateral knee    Gait: Steppage gait  L LE, L knee slaps into hyperextension  Swelling   Pitting edema bilat ankles and feet L> R    Special Tests:            Outcome Measures:      Treatments:      EDUCATION:   extensive education regarding mechanism of injury, relevant functional anatomy, treatment program rational, self management, HEP, and POC . Advised getting fitted for new AFO to mitigate L ankle DF weakness causing drop foot.      Goals:  1. Obtain AFO  2. Improve ambulation ability to least restrictive device on level surface  community distances   3. independent Home exercise program

## 2024-03-27 ENCOUNTER — EVALUATION (OUTPATIENT)
Dept: PHYSICAL THERAPY | Facility: CLINIC | Age: 79
End: 2024-03-27
Payer: MEDICARE

## 2024-03-27 DIAGNOSIS — M79.605 PAIN OF LEFT LOWER EXTREMITY: Primary | ICD-10-CM

## 2024-03-27 DIAGNOSIS — M62.81 MUSCLE WEAKNESS OF LOWER EXTREMITY: ICD-10-CM

## 2024-03-27 DIAGNOSIS — R26.2 DIFFICULTY IN WALKING INVOLVING LOWER LEG JOINT: ICD-10-CM

## 2024-03-27 DIAGNOSIS — M76.32 ILIOTIBIAL BAND SYNDROME OF LEFT SIDE: ICD-10-CM

## 2024-03-27 PROCEDURE — 97162 PT EVAL MOD COMPLEX 30 MIN: CPT | Mod: GP | Performed by: PHYSICAL THERAPIST

## 2024-03-27 NOTE — PATIENT INSTRUCTIONS
Huseyin Saunders to obtain L AFO before next PT visit. Qe will request prescription for device from referring provider

## 2024-04-08 ENCOUNTER — TELEPHONE (OUTPATIENT)
Dept: ORTHOPEDIC SURGERY | Facility: HOSPITAL | Age: 79
End: 2024-04-08
Payer: MEDICARE

## 2024-04-08 NOTE — TELEPHONE ENCOUNTER
Patient's wife called to see if Huseyin's physical therapist had messaged you about a brace.  The Physical therapist is requesting that the patient gets a custom AFO brace before he continues PT.  He has loss of motor control and foot drop on the left side making it difficult for him to do any of the exercises and walk.  I advised that I didn't see an order in already but I would follow up with you.

## 2024-04-10 DIAGNOSIS — M21.372 ACQUIRED LEFT FOOT DROP: Primary | ICD-10-CM

## 2024-04-10 NOTE — TELEPHONE ENCOUNTER
Voicemail left for patient's wife Radha advising the order is in the chart and that I can fax it to which ever location they decide to go to.  I also sent a [employer] message with the order attached to the patient.

## 2024-04-29 DIAGNOSIS — N13.8 BPH WITH OBSTRUCTION/LOWER URINARY TRACT SYMPTOMS: ICD-10-CM

## 2024-04-29 DIAGNOSIS — N18.32 CKD STAGE G3B/A1, GFR 30-44 AND ALBUMIN CREATININE RATIO <30 MG/G (MULTI): ICD-10-CM

## 2024-04-29 DIAGNOSIS — I63.9 CEREBROVASCULAR ACCIDENT (CVA), UNSPECIFIED MECHANISM (MULTI): ICD-10-CM

## 2024-04-29 DIAGNOSIS — N40.1 BPH WITH OBSTRUCTION/LOWER URINARY TRACT SYMPTOMS: ICD-10-CM

## 2024-04-29 DIAGNOSIS — R97.20 ELEVATED PSA: ICD-10-CM

## 2024-04-29 DIAGNOSIS — E83.52 HYPERCALCEMIA: ICD-10-CM

## 2024-04-29 DIAGNOSIS — I10 PRIMARY HYPERTENSION: ICD-10-CM

## 2024-04-29 DIAGNOSIS — Z00.00 ROUTINE GENERAL MEDICAL EXAMINATION AT HEALTH CARE FACILITY: ICD-10-CM

## 2024-04-30 RX ORDER — FUROSEMIDE 20 MG/1
20 TABLET ORAL DAILY
Qty: 30 TABLET | Refills: 0 | Status: SHIPPED | OUTPATIENT
Start: 2024-04-30 | End: 2024-05-31

## 2024-05-01 DIAGNOSIS — I10 PRIMARY HYPERTENSION: ICD-10-CM

## 2024-05-02 RX ORDER — FUROSEMIDE 20 MG/1
20 TABLET ORAL DAILY
Qty: 30 TABLET | Refills: 0 | OUTPATIENT
Start: 2024-05-02

## 2024-05-14 DIAGNOSIS — I10 HYPERTENSION, UNSPECIFIED TYPE: ICD-10-CM

## 2024-05-14 DIAGNOSIS — M10.9 PODAGRA: ICD-10-CM

## 2024-05-15 RX ORDER — PROBENECID AND COLCHICINE .5; 5 MG/1; MG/1
TABLET ORAL
Qty: 90 TABLET | Refills: 0 | Status: SHIPPED | OUTPATIENT
Start: 2024-05-15

## 2024-05-15 RX ORDER — CLONIDINE HYDROCHLORIDE 0.2 MG/1
TABLET ORAL
Qty: 180 TABLET | Refills: 0 | Status: SHIPPED | OUTPATIENT
Start: 2024-05-15

## 2024-05-20 ENCOUNTER — APPOINTMENT (OUTPATIENT)
Dept: PRIMARY CARE | Facility: CLINIC | Age: 79
End: 2024-05-20
Payer: MEDICARE

## 2024-05-21 ENCOUNTER — OFFICE VISIT (OUTPATIENT)
Dept: ORTHOPEDIC SURGERY | Facility: CLINIC | Age: 79
End: 2024-05-21
Payer: MEDICARE

## 2024-05-21 DIAGNOSIS — M76.32 ILIOTIBIAL BAND SYNDROME OF LEFT SIDE: ICD-10-CM

## 2024-05-21 DIAGNOSIS — M62.81 MUSCLE WEAKNESS OF LOWER EXTREMITY: Primary | ICD-10-CM

## 2024-05-21 DIAGNOSIS — M21.372 FOOT DROP, LEFT: ICD-10-CM

## 2024-05-21 PROCEDURE — 1160F RVW MEDS BY RX/DR IN RCRD: CPT | Performed by: FAMILY MEDICINE

## 2024-05-21 PROCEDURE — 1123F ACP DISCUSS/DSCN MKR DOCD: CPT | Performed by: FAMILY MEDICINE

## 2024-05-21 PROCEDURE — 1036F TOBACCO NON-USER: CPT | Performed by: FAMILY MEDICINE

## 2024-05-21 PROCEDURE — 99213 OFFICE O/P EST LOW 20 MIN: CPT | Performed by: FAMILY MEDICINE

## 2024-05-21 PROCEDURE — 1159F MED LIST DOCD IN RCRD: CPT | Performed by: FAMILY MEDICINE

## 2024-05-21 NOTE — PROGRESS NOTES
** Please excuse any errors in grammar or translation related to this dictation. Voice recognition software was utilized to prepare this document. **    Assessment & Plan:    Dx: Left leg weakness s/p CVA vs Left IT band syndrome    Pain and tenderness over his distal left leg is improving.  Continues to be weak in his left lower extremity compared to his right.  Foot drop is also present secondary to his previous CVA causing left lower extremity weakness.  Do feel patient would benefit from AFO to mitigate his foot drop and provide a more stable gait that will then hopefully allow him for lower extremity strengthening.  Patient was given a home exercise program today and recommended to reschedule with as able physical therapy for more targeted rehab.  Can continue to use rollator for stability, consider use of cane for shorter term ambulation.    Chief complaint:  Left leg pain    HPI:  5/21/24: Patient follows up for left leg pain/weakness and foot drop.  He has not been able to get the brace yet due to insurance reasons.  He ambulates with a rollator.  His physical therapist is not letting him start PT with out the AFO brace.    2/28/24: 77 y/o patient, hx of stroke in 2010 causing left leg weakness and left foot drop, presents with left leg pain.  This complaint has been ongoing for 2 months.  He had a fall from his bed to the floor around that time, unknown mechanism, but is unsure if that caused it. Symptoms have progressively worsened with time.  Pain is most prominent on the lateral aspect of the thigh, sometimes goes past the knee.  Denies numbness/tingling. Symptoms are aggravated by changing positions from sitting to standing, walking. To date, patient has tried home health PT in Jan-Feb 2024, who recommended transition to outpatient PT for continued sterngthening. Denies previous surgery to this site. He was using a brace previously for foot drop but it was cutting off his circulation so he discontinued  it.    Exam:  Left hip Exam:  Antalgic gait, assisted with rollator.   No warmth, erythema or ecchymosis overlying.  Active flexion >90 degrees with grossly normal extension, ,abduction, adduction.  Limited IR and ER.  NTTP over greater trochanter, glute tendons, around the knee, tibia or fibula.  TTP over distal band.   4/5 strength of hip flexion, abduction, & adduction; 4/5 knee flexion, extension  SILT    Foot drop is present of left leg.  Limited dorsiflexion 2/5 strength, normal plantarflexion 5/5 strength.    Results:  X-ray of the left femur from prior was reviewed showing moderate osteoarthritis of the left hip joint.  X-ray of the left knee from prior was reviewed showing medial joint space narrowing and no acute fracture.

## 2024-05-30 DIAGNOSIS — I10 PRIMARY HYPERTENSION: ICD-10-CM

## 2024-05-31 RX ORDER — FUROSEMIDE 20 MG/1
20 TABLET ORAL DAILY
Qty: 30 TABLET | Refills: 0 | Status: SHIPPED | OUTPATIENT
Start: 2024-05-31

## 2024-06-01 DIAGNOSIS — I10 PRIMARY HYPERTENSION: ICD-10-CM

## 2024-06-02 DIAGNOSIS — I10 HYPERTENSION, UNSPECIFIED TYPE: ICD-10-CM

## 2024-06-04 RX ORDER — FUROSEMIDE 20 MG/1
20 TABLET ORAL DAILY
Qty: 30 TABLET | Refills: 0 | OUTPATIENT
Start: 2024-06-04

## 2024-06-04 RX ORDER — POTASSIUM CHLORIDE 20 MEQ/1
20 TABLET, EXTENDED RELEASE ORAL 3 TIMES DAILY
Qty: 90 TABLET | Refills: 0 | Status: SHIPPED | OUTPATIENT
Start: 2024-06-04

## 2024-06-04 RX ORDER — LOSARTAN POTASSIUM 100 MG/1
100 TABLET ORAL DAILY
Qty: 90 TABLET | Refills: 0 | Status: SHIPPED | OUTPATIENT
Start: 2024-06-04

## 2024-06-05 ENCOUNTER — LAB (OUTPATIENT)
Dept: LAB | Facility: LAB | Age: 79
End: 2024-06-05
Payer: MEDICARE

## 2024-06-05 ENCOUNTER — OFFICE VISIT (OUTPATIENT)
Dept: PRIMARY CARE | Facility: CLINIC | Age: 79
End: 2024-06-05
Payer: MEDICARE

## 2024-06-05 VITALS
HEART RATE: 55 BPM | WEIGHT: 220 LBS | DIASTOLIC BLOOD PRESSURE: 83 MMHG | OXYGEN SATURATION: 96 % | SYSTOLIC BLOOD PRESSURE: 151 MMHG | HEIGHT: 72 IN | BODY MASS INDEX: 29.8 KG/M2

## 2024-06-05 DIAGNOSIS — E21.3 HYPERPARATHYROIDISM (MULTI): ICD-10-CM

## 2024-06-05 DIAGNOSIS — I10 HYPERTENSION, UNSPECIFIED TYPE: ICD-10-CM

## 2024-06-05 DIAGNOSIS — Z00.00 ROUTINE GENERAL MEDICAL EXAMINATION AT HEALTH CARE FACILITY: Primary | ICD-10-CM

## 2024-06-05 DIAGNOSIS — N18.32 CKD STAGE G3B/A1, GFR 30-44 AND ALBUMIN CREATININE RATIO <30 MG/G (MULTI): ICD-10-CM

## 2024-06-05 DIAGNOSIS — E78.2 COMBINED HYPERLIPIDEMIA: ICD-10-CM

## 2024-06-05 DIAGNOSIS — Z11.59 ENCOUNTER FOR HEPATITIS C SCREENING TEST FOR LOW RISK PATIENT: ICD-10-CM

## 2024-06-05 DIAGNOSIS — R97.20 ELEVATED PSA: ICD-10-CM

## 2024-06-05 DIAGNOSIS — Z00.00 ROUTINE GENERAL MEDICAL EXAMINATION AT HEALTH CARE FACILITY: ICD-10-CM

## 2024-06-05 DIAGNOSIS — I10 PRIMARY HYPERTENSION: ICD-10-CM

## 2024-06-05 LAB
ALBUMIN SERPL BCP-MCNC: 4.2 G/DL (ref 3.4–5)
ALP SERPL-CCNC: 92 U/L (ref 33–136)
ALT SERPL W P-5'-P-CCNC: 11 U/L (ref 10–52)
ANION GAP SERPL CALC-SCNC: 13 MMOL/L (ref 10–20)
AST SERPL W P-5'-P-CCNC: 13 U/L (ref 9–39)
BILIRUB SERPL-MCNC: 1 MG/DL (ref 0–1.2)
BUN SERPL-MCNC: 16 MG/DL (ref 6–23)
CA-I BLD-SCNC: 1.46 MMOL/L (ref 1.1–1.33)
CALCIUM SERPL-MCNC: 11 MG/DL (ref 8.6–10.6)
CHLORIDE SERPL-SCNC: 103 MMOL/L (ref 98–107)
CHOLEST SERPL-MCNC: 166 MG/DL (ref 0–199)
CHOLESTEROL/HDL RATIO: 4.6
CO2 SERPL-SCNC: 27 MMOL/L (ref 21–32)
CREAT SERPL-MCNC: 1.23 MG/DL (ref 0.5–1.3)
CREAT UR-MCNC: 86.4 MG/DL (ref 20–370)
EGFRCR SERPLBLD CKD-EPI 2021: 60 ML/MIN/1.73M*2
ERYTHROCYTE [DISTWIDTH] IN BLOOD BY AUTOMATED COUNT: 13.1 % (ref 11.5–14.5)
GLUCOSE SERPL-MCNC: 106 MG/DL (ref 74–99)
HCT VFR BLD AUTO: 47.5 % (ref 41–52)
HCV AB SER QL: NONREACTIVE
HDLC SERPL-MCNC: 35.9 MG/DL
HGB BLD-MCNC: 15.9 G/DL (ref 13.5–17.5)
LDLC SERPL CALC-MCNC: 100 MG/DL
MCH RBC QN AUTO: 30.3 PG (ref 26–34)
MCHC RBC AUTO-ENTMCNC: 33.5 G/DL (ref 32–36)
MCV RBC AUTO: 91 FL (ref 80–100)
MICROALBUMIN UR-MCNC: <7 MG/L
MICROALBUMIN/CREAT UR: NORMAL MG/G{CREAT}
NON HDL CHOLESTEROL: 130 MG/DL (ref 0–149)
NRBC BLD-RTO: 0 /100 WBCS (ref 0–0)
PLATELET # BLD AUTO: 188 X10*3/UL (ref 150–450)
POTASSIUM SERPL-SCNC: 4.2 MMOL/L (ref 3.5–5.3)
PROT SERPL-MCNC: 6.6 G/DL (ref 6.4–8.2)
PSA SERPL-MCNC: 7.89 NG/ML
RBC # BLD AUTO: 5.24 X10*6/UL (ref 4.5–5.9)
SODIUM SERPL-SCNC: 139 MMOL/L (ref 136–145)
TRIGL SERPL-MCNC: 150 MG/DL (ref 0–149)
TSH SERPL-ACNC: 0.97 MIU/L (ref 0.44–3.98)
URATE SERPL-MCNC: 5.3 MG/DL (ref 4–7.5)
VLDL: 30 MG/DL (ref 0–40)
WBC # BLD AUTO: 6.5 X10*3/UL (ref 4.4–11.3)

## 2024-06-05 PROCEDURE — 1123F ACP DISCUSS/DSCN MKR DOCD: CPT | Performed by: INTERNAL MEDICINE

## 2024-06-05 PROCEDURE — 82330 ASSAY OF CALCIUM: CPT

## 2024-06-05 PROCEDURE — 1159F MED LIST DOCD IN RCRD: CPT | Performed by: INTERNAL MEDICINE

## 2024-06-05 PROCEDURE — 85027 COMPLETE CBC AUTOMATED: CPT

## 2024-06-05 PROCEDURE — 84550 ASSAY OF BLOOD/URIC ACID: CPT

## 2024-06-05 PROCEDURE — 86803 HEPATITIS C AB TEST: CPT

## 2024-06-05 PROCEDURE — 3079F DIAST BP 80-89 MM HG: CPT | Performed by: INTERNAL MEDICINE

## 2024-06-05 PROCEDURE — 1170F FXNL STATUS ASSESSED: CPT | Performed by: INTERNAL MEDICINE

## 2024-06-05 PROCEDURE — 84153 ASSAY OF PSA TOTAL: CPT

## 2024-06-05 PROCEDURE — 80053 COMPREHEN METABOLIC PANEL: CPT

## 2024-06-05 PROCEDURE — G0439 PPPS, SUBSEQ VISIT: HCPCS | Performed by: INTERNAL MEDICINE

## 2024-06-05 PROCEDURE — 84443 ASSAY THYROID STIM HORMONE: CPT

## 2024-06-05 PROCEDURE — 3077F SYST BP >= 140 MM HG: CPT | Performed by: INTERNAL MEDICINE

## 2024-06-05 PROCEDURE — 82570 ASSAY OF URINE CREATININE: CPT

## 2024-06-05 PROCEDURE — 1160F RVW MEDS BY RX/DR IN RCRD: CPT | Performed by: INTERNAL MEDICINE

## 2024-06-05 PROCEDURE — 82043 UR ALBUMIN QUANTITATIVE: CPT

## 2024-06-05 PROCEDURE — 36415 COLL VENOUS BLD VENIPUNCTURE: CPT

## 2024-06-05 PROCEDURE — 83970 ASSAY OF PARATHORMONE: CPT

## 2024-06-05 PROCEDURE — 80061 LIPID PANEL: CPT

## 2024-06-05 PROCEDURE — 1036F TOBACCO NON-USER: CPT | Performed by: INTERNAL MEDICINE

## 2024-06-05 RX ORDER — DOXAZOSIN 4 MG/1
4 TABLET ORAL NIGHTLY
Qty: 30 TABLET | Refills: 5 | Status: SHIPPED | OUTPATIENT
Start: 2024-06-05 | End: 2024-12-02

## 2024-06-05 RX ORDER — HYDROCHLOROTHIAZIDE 50 MG/1
50 TABLET ORAL DAILY
COMMUNITY
Start: 2014-07-16 | End: 2024-06-05 | Stop reason: WASHOUT

## 2024-06-05 ASSESSMENT — ENCOUNTER SYMPTOMS
ACTIVITY CHANGE: 0
ABDOMINAL DISTENTION: 0
FACIAL ASYMMETRY: 0
EYE ITCHING: 0
ABDOMINAL PAIN: 0
EYE DISCHARGE: 0
DYSPHORIC MOOD: 0
DIAPHORESIS: 0
COUGH: 0
NERVOUS/ANXIOUS: 0
POLYPHAGIA: 0
EYE REDNESS: 0
HYPERACTIVE: 0
ARTHRALGIAS: 0
CHOKING: 0
CHEST TIGHTNESS: 0
BLOOD IN STOOL: 0
LIGHT-HEADEDNESS: 0
AGITATION: 0
HEADACHES: 0
CONFUSION: 0
POLYDIPSIA: 0
FATIGUE: 0
EYE PAIN: 0
VOMITING: 0
SHORTNESS OF BREATH: 0
FEVER: 0
JOINT SWELLING: 0
NUMBNESS: 0
FACIAL SWELLING: 0
APPETITE CHANGE: 0
NAUSEA: 0
BACK PAIN: 0
SPEECH DIFFICULTY: 0
DIFFICULTY URINATING: 0

## 2024-06-05 ASSESSMENT — PATIENT HEALTH QUESTIONNAIRE - PHQ9
2. FEELING DOWN, DEPRESSED OR HOPELESS: NOT AT ALL
SUM OF ALL RESPONSES TO PHQ9 QUESTIONS 1 AND 2: 0
1. LITTLE INTEREST OR PLEASURE IN DOING THINGS: NOT AT ALL

## 2024-06-05 ASSESSMENT — ACTIVITIES OF DAILY LIVING (ADL)
MANAGING_FINANCES: INDEPENDENT
GROCERY_SHOPPING: INDEPENDENT
TAKING_MEDICATION: INDEPENDENT
BATHING: INDEPENDENT
DRESSING: INDEPENDENT
DOING_HOUSEWORK: INDEPENDENT

## 2024-06-05 NOTE — PROGRESS NOTES
Subjective   Reason for Visit: Huseyin Saunders is an 79 y.o. male here for a Medicare Wellness visit.     Past Medical, Surgical, and Family History reviewed and updated in chart.    Reviewed all medications by prescribing practitioner or clinical pharmacist (such as prescriptions, OTCs, herbal therapies and supplements) and documented in the medical record.    Past Medical, Surgical, and Family History reviewed and updated in chart.    Reviewed all medications by prescribing practitioner or clinical pharmacist (such as prescriptions, OTCs, herbal therapies and supplements) and documented in the medical record.        Patient Care Team:  Kenji Morris DO as PCP - General (Internal Medicine)  Kenji Morris DO as PCP - [facility] Attributed Provider     HPI    Patient is a 79-year-old male with past medical history of BPH elevated PSA chronic kidney disease stage III hypertension external vascular accident dyslipidemia and likely primary hyperparathyroidism who presents for Medicare wellness.    With regards to his medical care patient is happy with where he is at and does not wish to escalate care.  He is aware of the fact that his calcium has been elevated historically and he has been referred to an endocrinologist for evaluation.  He may need surgical excision of his parathyroid glands however he is deferring that at this time.  On arrival he is bradycardic at 57 although he is currently asymptomatic.  He denies any lightheadedness or dizziness.  No recent falls.  He is not on any beta-blocker .    With regards to his gout he has had no recent gout flares.    BPH due for PSA recheck.  He is not on any BPH medications.    He lives at home with his wife.  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  His blood pressure is not well-controlled at time but he takes for medications.  He has been reluctant historically to add additional blood pressure medications.    He  follows with physical therapy and orthopedist and has an AFO being prepared.  He is currently walking with a walker.    His wife is at home and has multiple medical issues but for the most part is assisting the patient with his ambulatory needs.    Patient Care Team:  Kenji Morris DO as PCP - General (Internal Medicine)  Kenji Morris DO as PCP [facility]     Review of Systems   Constitutional:  Negative for activity change, appetite change, diaphoresis, fatigue and fever.   HENT:  Negative for dental problem, drooling, ear pain, facial swelling, hearing loss and nosebleeds.    Eyes:  Negative for pain, discharge, redness and itching.   Respiratory:  Negative for cough, choking, chest tightness and shortness of breath.    Gastrointestinal:  Negative for abdominal distention, abdominal pain, blood in stool, nausea and vomiting.   Endocrine: Negative for cold intolerance, heat intolerance, polydipsia and polyphagia.   Genitourinary:  Negative for difficulty urinating.   Musculoskeletal:  Negative for arthralgias, back pain and joint swelling.   Allergic/Immunologic: Negative for environmental allergies and food allergies.   Neurological:  Negative for facial asymmetry, speech difficulty, light-headedness, numbness and headaches.   Psychiatric/Behavioral:  Negative for agitation, confusion and dysphoric mood. The patient is not nervous/anxious and is not hyperactive.        Objective   Vitals:  /83   Pulse 55   Ht 1.829 m (6')   Wt 99.8 kg (220 lb)   SpO2 96%   BMI 29.84 kg/m²       Physical Exam  Constitutional:       Appearance: Normal appearance.   HENT:      Head: Normocephalic and atraumatic.      Nose: No congestion or rhinorrhea.      Mouth/Throat:      Mouth: Mucous membranes are moist.   Eyes:      Pupils: Pupils are equal, round, and reactive to light.   Cardiovascular:      Rate and Rhythm: Normal rate and regular rhythm.      Pulses: Normal pulses.      Heart sounds: Normal  heart sounds. No murmur heard.     No friction rub. No gallop.   Pulmonary:      Effort: Pulmonary effort is normal. No respiratory distress.      Breath sounds: Normal breath sounds. No stridor. No wheezing or rales.   Abdominal:      General: Abdomen is flat. Bowel sounds are normal. There is no distension.      Palpations: Abdomen is soft. There is no mass.      Tenderness: There is no guarding.      Hernia: No hernia is present.   Musculoskeletal:         General: No swelling.      Cervical back: No rigidity.      Right lower leg: No edema.      Left lower leg: No edema.      Comments: Weakness of the left lower extremity in the left upper extremity.  Walking with a cane   Skin:     General: Skin is warm and dry.   Neurological:      General: No focal deficit present.      Mental Status: He is alert and oriented to person, place, and time. Mental status is at baseline.      Motor: No weakness.      Gait: Gait normal.   Psychiatric:         Mood and Affect: Mood normal.         Behavior: Behavior normal.         Thought Content: Thought content normal.         Assessment/Plan   Problem List Items Addressed This Visit       Combined hyperlipidemia    Relevant Orders    Uric acid    Elevated PSA    Relevant Medications    doxazosin (Cardura) 4 mg tablet    Other Relevant Orders    PSA    Hyperparathyroidism (Multi)    Relevant Orders    PTH, intact    Calcium, ionized    Uric acid     Other Visit Diagnoses       Routine general medical examination at health care facility    -  Primary    Relevant Orders    CBC    Comprehensive metabolic panel    Lipid Panel    TSH with reflex to Free T4 if abnormal    Uric acid    Follow Up In [facility] - PCP - Established    Primary hypertension        CKD stage G3b/A1, GFR 30-44 and albumin creatinine ratio <30 mg/g (Multi)        Relevant Orders    Uric acid    Hypertension, unspecified type        Relevant Orders    CBC    Encounter for hepatitis C screening test  for low risk patient        Relevant Orders    Hepatitis C antibody          Recommend getting the AFOs to assist with the dropfoot on the left.  Would benefit from additional medications for the purposes of treating hypertension especially with prior history of stroke.  Check LDL fasting.  Would benefit from statin but deferring at this time.  Check LDL fasting.    Patient still deferring endocrinology evaluation for primary hyperparathyroidism.  Will continue to monitor labs.    CKD stage III.Check urine albumin.  Continue high-dose losartan.  Might benefit from SGLT2 but deferring at this time  Follow-up 6-month

## 2024-06-06 LAB — PTH-INTACT SERPL-MCNC: 253.4 PG/ML (ref 18.5–88)

## 2024-06-30 DIAGNOSIS — I10 PRIMARY HYPERTENSION: ICD-10-CM

## 2024-06-30 DIAGNOSIS — I10 HYPERTENSION, UNSPECIFIED TYPE: ICD-10-CM

## 2024-07-01 RX ORDER — POTASSIUM CHLORIDE 20 MEQ/1
20 TABLET, EXTENDED RELEASE ORAL 3 TIMES DAILY
Qty: 90 TABLET | Refills: 5 | Status: SHIPPED | OUTPATIENT
Start: 2024-07-01

## 2024-07-01 RX ORDER — FUROSEMIDE 20 MG/1
20 TABLET ORAL DAILY
Qty: 30 TABLET | Refills: 5 | Status: SHIPPED | OUTPATIENT
Start: 2024-07-01

## 2024-07-19 DIAGNOSIS — I10 PRIMARY HYPERTENSION: ICD-10-CM

## 2024-07-21 RX ORDER — METOPROLOL SUCCINATE 200 MG/1
200 TABLET, EXTENDED RELEASE ORAL NIGHTLY
Qty: 90 TABLET | Refills: 1 | Status: SHIPPED | OUTPATIENT
Start: 2024-07-21

## 2024-08-09 DIAGNOSIS — I10 PRIMARY HYPERTENSION: ICD-10-CM

## 2024-08-09 DIAGNOSIS — M10.9 PODAGRA: ICD-10-CM

## 2024-08-12 RX ORDER — AMLODIPINE BESYLATE 10 MG/1
10 TABLET ORAL DAILY
Qty: 90 TABLET | Refills: 1 | Status: SHIPPED | OUTPATIENT
Start: 2024-08-12

## 2024-08-12 RX ORDER — PROBENECID AND COLCHICINE .5; 5 MG/1; MG/1
TABLET ORAL
Qty: 90 TABLET | Refills: 1 | Status: SHIPPED | OUTPATIENT
Start: 2024-08-12

## 2024-08-19 DIAGNOSIS — I10 HYPERTENSION, UNSPECIFIED TYPE: ICD-10-CM

## 2024-08-19 RX ORDER — CLONIDINE HYDROCHLORIDE 0.2 MG/1
0.2 TABLET ORAL EVERY 12 HOURS
Qty: 180 TABLET | Refills: 1 | Status: SHIPPED | OUTPATIENT
Start: 2024-08-19

## 2024-08-27 DIAGNOSIS — I10 HYPERTENSION, UNSPECIFIED TYPE: ICD-10-CM

## 2024-08-28 RX ORDER — LOSARTAN POTASSIUM 100 MG/1
100 TABLET ORAL DAILY
Qty: 90 TABLET | Refills: 1 | Status: SHIPPED | OUTPATIENT
Start: 2024-08-28

## 2024-10-10 ENCOUNTER — APPOINTMENT (OUTPATIENT)
Dept: AUDIOLOGY | Facility: CLINIC | Age: 79
End: 2024-10-10
Payer: MEDICARE

## 2024-12-05 ENCOUNTER — APPOINTMENT (OUTPATIENT)
Dept: PRIMARY CARE | Facility: CLINIC | Age: 79
End: 2024-12-05
Payer: MEDICARE

## 2024-12-09 ENCOUNTER — LAB (OUTPATIENT)
Dept: LAB | Facility: LAB | Age: 79
End: 2024-12-09
Payer: MEDICARE

## 2024-12-09 ENCOUNTER — APPOINTMENT (OUTPATIENT)
Dept: PRIMARY CARE | Facility: CLINIC | Age: 79
End: 2024-12-09
Payer: MEDICARE

## 2024-12-09 VITALS
BODY MASS INDEX: 30.79 KG/M2 | HEART RATE: 55 BPM | SYSTOLIC BLOOD PRESSURE: 127 MMHG | OXYGEN SATURATION: 98 % | WEIGHT: 227 LBS | DIASTOLIC BLOOD PRESSURE: 80 MMHG

## 2024-12-09 DIAGNOSIS — E21.0 PRIMARY HYPERPARATHYROIDISM (MULTI): Primary | ICD-10-CM

## 2024-12-09 DIAGNOSIS — R97.20 ELEVATED PSA: ICD-10-CM

## 2024-12-09 DIAGNOSIS — E83.52 HYPERCALCEMIA: ICD-10-CM

## 2024-12-09 DIAGNOSIS — E21.0 PRIMARY HYPERPARATHYROIDISM (MULTI): ICD-10-CM

## 2024-12-09 DIAGNOSIS — M21.379 DROP FOOT GAIT: ICD-10-CM

## 2024-12-09 DIAGNOSIS — Z00.00 ROUTINE GENERAL MEDICAL EXAMINATION AT HEALTH CARE FACILITY: ICD-10-CM

## 2024-12-09 LAB
CA-I BLD-SCNC: 1.4 MMOL/L (ref 1.1–1.33)
PSA SERPL-MCNC: 7.69 NG/ML
PTH-INTACT SERPL-MCNC: 297.4 PG/ML (ref 18.5–88)

## 2024-12-09 PROCEDURE — 1160F RVW MEDS BY RX/DR IN RCRD: CPT | Performed by: INTERNAL MEDICINE

## 2024-12-09 PROCEDURE — 36415 COLL VENOUS BLD VENIPUNCTURE: CPT

## 2024-12-09 PROCEDURE — 83970 ASSAY OF PARATHORMONE: CPT

## 2024-12-09 PROCEDURE — 3079F DIAST BP 80-89 MM HG: CPT | Performed by: INTERNAL MEDICINE

## 2024-12-09 PROCEDURE — 99214 OFFICE O/P EST MOD 30 MIN: CPT | Performed by: INTERNAL MEDICINE

## 2024-12-09 PROCEDURE — G2211 COMPLEX E/M VISIT ADD ON: HCPCS | Performed by: INTERNAL MEDICINE

## 2024-12-09 PROCEDURE — 1036F TOBACCO NON-USER: CPT | Performed by: INTERNAL MEDICINE

## 2024-12-09 PROCEDURE — 82330 ASSAY OF CALCIUM: CPT

## 2024-12-09 PROCEDURE — 3074F SYST BP LT 130 MM HG: CPT | Performed by: INTERNAL MEDICINE

## 2024-12-09 PROCEDURE — 1123F ACP DISCUSS/DSCN MKR DOCD: CPT | Performed by: INTERNAL MEDICINE

## 2024-12-09 PROCEDURE — 1159F MED LIST DOCD IN RCRD: CPT | Performed by: INTERNAL MEDICINE

## 2024-12-09 PROCEDURE — 84153 ASSAY OF PSA TOTAL: CPT

## 2024-12-09 RX ORDER — ALENDRONATE SODIUM 70 MG/1
70 TABLET ORAL
Qty: 4 TABLET | Refills: 11 | Status: SHIPPED | OUTPATIENT
Start: 2024-12-09 | End: 2025-12-09

## 2024-12-09 ASSESSMENT — ENCOUNTER SYMPTOMS
AGITATION: 0
ABDOMINAL PAIN: 0
FEVER: 0
DIAPHORESIS: 0
HYPERACTIVE: 0
BLOOD IN STOOL: 0
NUMBNESS: 0
FACIAL ASYMMETRY: 0
FATIGUE: 0
JOINT SWELLING: 0
LIGHT-HEADEDNESS: 0
SHORTNESS OF BREATH: 0
FACIAL SWELLING: 0
APPETITE CHANGE: 0
COUGH: 0
VOMITING: 0
BACK PAIN: 0
ABDOMINAL DISTENTION: 0
NERVOUS/ANXIOUS: 0
ACTIVITY CHANGE: 0
DIFFICULTY URINATING: 0
ARTHRALGIAS: 0
EYE ITCHING: 0
EYE REDNESS: 0
CHOKING: 0
EYE PAIN: 0
SPEECH DIFFICULTY: 0
POLYPHAGIA: 0
DYSPHORIC MOOD: 0
CHEST TIGHTNESS: 0
HEADACHES: 0
NAUSEA: 0
CONFUSION: 0
EYE DISCHARGE: 0
POLYDIPSIA: 0

## 2024-12-09 NOTE — PROGRESS NOTES
Patient was identified as a fall risk. Risk prevention instructions Subjective   Reason for Visit: Huseyin Saunders is an 79 y.o. male here for a Medicare Wellness visit.     Past Medical, Surgical, and Family History reviewed and updated in chart.    Reviewed all medications by prescribing practitioner or clinical pharmacist (such as prescriptions, OTCs, herbal therapies and supplements) and documented in the medical record.    HPI    Patient is a 78-year-old male with past medical history of BPH elevated PSA chronic kidney disease stage III hypertension history of hx of vascular accident with residue hemiplegia on the LLE dropfoot,  dyslipidemia and likely primary hyperparathyroidism who presents for follow-up     Patient likely has primary hyperparathyroidism refuses to see endocrinology or ENT.  He is due for for PTH as well as ionized calcium.  He is on Lasix as well as alendronate.    Hypertension elevated today.  He is not interested in blood pressure medications at this time we will continue current medications    Chronic gout.  No active flare.  Compliant with his daily medications.    With regards to his medical care patient is happy with where he is at and does not wish to escalate care.  He is aware of the fact that his calcium has been elevated historically and he has been referred to an endocrinologist for evaluation.  He may need surgical excision of his parathyroid glands however he is deferring that at this time.        Patient Care Team:  Kenji Morris DO as PCP - General (Internal Medicine)  Kenji Morris DO as PCP - [facility]     Review of Systems   Constitutional:  Negative for activity change, appetite change, diaphoresis, fatigue and fever.   HENT:  Negative for dental problem, drooling, ear pain, facial swelling, hearing loss and nosebleeds.    Eyes:  Negative for pain, discharge, redness and itching.   Respiratory:  Negative for cough, choking, chest tightness and shortness  of breath.    Gastrointestinal:  Negative for abdominal distention, abdominal pain, blood in stool, nausea and vomiting.   Endocrine: Negative for cold intolerance, heat intolerance, polydipsia and polyphagia.   Genitourinary:  Negative for difficulty urinating.   Musculoskeletal:  Negative for arthralgias, back pain and joint swelling.   Allergic/Immunologic: Negative for environmental allergies and food allergies.   Neurological:  Negative for facial asymmetry, speech difficulty, light-headedness, numbness and headaches.   Psychiatric/Behavioral:  Negative for agitation, confusion and dysphoric mood. The patient is not nervous/anxious and is not hyperactive.        Objective   Vitals:  /80   Pulse 55   Wt 103 kg (227 lb)   SpO2 98%   BMI 30.79 kg/m²       Physical Exam  Constitutional:       Appearance: Normal appearance.   HENT:      Head: Normocephalic and atraumatic.      Nose: No congestion or rhinorrhea.      Mouth/Throat:      Mouth: Mucous membranes are moist.   Eyes:      Pupils: Pupils are equal, round, and reactive to light.   Cardiovascular:      Rate and Rhythm: Normal rate and regular rhythm.      Pulses: Normal pulses.      Heart sounds: Normal heart sounds. No murmur heard.     No friction rub. No gallop.   Pulmonary:      Effort: Pulmonary effort is normal. No respiratory distress.      Breath sounds: Normal breath sounds. No stridor. No wheezing or rales.   Abdominal:      General: Abdomen is flat. Bowel sounds are normal. There is no distension.      Palpations: Abdomen is soft. There is no mass.      Tenderness: There is no guarding.      Hernia: No hernia is present.   Musculoskeletal:         General: No swelling.      Cervical back: No rigidity.      Right lower leg: No edema.      Left lower leg: No edema.      Comments: Weakness of the left lower extremity in the left upper extremity.  Walking with a cane   Skin:     General: Skin is warm and dry.   Neurological:      General: No  focal deficit present.      Mental Status: He is alert and oriented to person, place, and time. Mental status is at baseline.      Motor: No weakness.      Gait: Gait normal.   Psychiatric:         Mood and Affect: Mood normal.         Behavior: Behavior normal.         Thought Content: Thought content normal.         Assessment/Plan   Problem List Items Addressed This Visit       Elevated PSA    Relevant Orders    PSA    Hypercalcemia    Relevant Medications    alendronate (Fosamax) 70 mg tablet     Other Visit Diagnoses       Primary hyperparathyroidism (Multi)    -  Primary    Relevant Orders    PSA    PTH, intact    Calcium, ionized    Routine general medical examination at health care facility        Relevant Orders    Follow Up In [facility] - PCP - Medicare Annual    Referral to Ophthalmology    Drop foot gait        Relevant Orders    Referral to Physical Therapy             .  Continues to refuse endocrinology or ENT referral.  Will continue checking ionized calcium PTH and PSA.  Patient understands risk of untreated hyperparathyroidism and does not wish to pursue    Follow-up 6 months for reevaluation

## 2024-12-13 ENCOUNTER — CLINICAL SUPPORT (OUTPATIENT)
Dept: AUDIOLOGY | Facility: CLINIC | Age: 79
End: 2024-12-13
Payer: MEDICARE

## 2024-12-13 ENCOUNTER — APPOINTMENT (OUTPATIENT)
Dept: OTOLARYNGOLOGY | Facility: CLINIC | Age: 79
End: 2024-12-13
Payer: MEDICARE

## 2024-12-13 VITALS — TEMPERATURE: 97.1 F | HEIGHT: 72 IN | WEIGHT: 225 LBS | BODY MASS INDEX: 30.48 KG/M2

## 2024-12-13 DIAGNOSIS — H61.23 BILATERAL IMPACTED CERUMEN: Primary | ICD-10-CM

## 2024-12-13 DIAGNOSIS — H93.13 TINNITUS OF BOTH EARS: ICD-10-CM

## 2024-12-13 DIAGNOSIS — H90.3 SENSORINEURAL HEARING LOSS (SNHL) OF BOTH EARS: ICD-10-CM

## 2024-12-13 DIAGNOSIS — H90.3 SENSORINEURAL HEARING LOSS, BILATERAL: Primary | ICD-10-CM

## 2024-12-13 DIAGNOSIS — H93.8X1 SENSATION OF FULLNESS IN RIGHT EAR: ICD-10-CM

## 2024-12-13 PROCEDURE — 92550 TYMPANOMETRY & REFLEX THRESH: CPT

## 2024-12-13 PROCEDURE — 1160F RVW MEDS BY RX/DR IN RCRD: CPT | Performed by: NURSE PRACTITIONER

## 2024-12-13 PROCEDURE — 1159F MED LIST DOCD IN RCRD: CPT | Performed by: NURSE PRACTITIONER

## 2024-12-13 PROCEDURE — 92557 COMPREHENSIVE HEARING TEST: CPT

## 2024-12-13 PROCEDURE — 1123F ACP DISCUSS/DSCN MKR DOCD: CPT | Performed by: NURSE PRACTITIONER

## 2024-12-13 PROCEDURE — 1036F TOBACCO NON-USER: CPT | Performed by: NURSE PRACTITIONER

## 2024-12-13 PROCEDURE — 99203 OFFICE O/P NEW LOW 30 MIN: CPT | Performed by: NURSE PRACTITIONER

## 2024-12-13 ASSESSMENT — PATIENT HEALTH QUESTIONNAIRE - PHQ9
SUM OF ALL RESPONSES TO PHQ9 QUESTIONS 1 AND 2: 0
2. FEELING DOWN, DEPRESSED OR HOPELESS: NOT AT ALL
1. LITTLE INTEREST OR PLEASURE IN DOING THINGS: NOT AT ALL

## 2024-12-13 NOTE — PROGRESS NOTES
AUDIOLOGY ADULT EVALUATION      Name:  Huseyin Saunders   :  1945  Age:  79 y.o.  Date of Evaluation:  2024    Time: 09:00-09:20    IMPRESSIONS     Right ear: Normal middle ear functioning with normal hearing sensitivity 125-2000 Hz sloping to mild sensorineural hearing loss and excellent (96%) word understanding at 60 dB HL.   Left ear: Normal middle ear functioning with normal hearing sensitivity 125-2000 Hz sloping to mild sensorineural hearing loss and excellent (96%) word understanding at 60 dB HL.     Amplification needs: Huseyin is a borderline hearing aid candidate at this time. Consider amplification if hearing loss begins affecting communication.    RECOMMENDATIONS     Continue medical follow up with primary care provider and/or Ears Nose and Throat (ENT) provider as recommended.  Return for audiologic evaluation in conjunction with medical management or annually (whichever is sooner) to monitor hearing sensitivity and assess middle ear status or sooner should concerns arise. The audiology department can be reached at [phone] to schedule an appointment.   Avoid exposure to loud sounds by moving away from the noise, turning down the volume, or wearing proper hearing protection correctly.  Appropriate communication techniques (face-to-face at a 4-6 foot maximum distance, reduced background noise, speech at normal volume and slower rate, good lighting, etc).     HISTORY     Mr. Saunders, 79 y.o., was seen today for a follow-up audiologic evaluation in conjunction with an evaluation with Farzana Wu APRN.CNP, for his annual hearing evaluation.    History obtained from patient report and chart review.     Change in Hearing:   denied, however he noted that his wife has concerns for his hearing abilities.   Tinnitus:   Yes, in both ears; intermittent and non-bothersome  Otalgia: denied  Aural Pressure/Fullness: denied  Recent Ear Infections/Otorrhea: denied  Dizziness: denied  History of Ear  Surgeries: denied  History of Noise Exposure:  Yes, occupational noise exposure (), and hearing protection was reportedly not worn  Hearing Aid Use: none    AUDIOGRAM         TEST RESULTS     Otoscopic Evaluation:  Right Ear: Ear canal clear, tympanic membrane visualized.  Left Ear: Ear canal clear, tympanic membrane visualized.    Tympanometry (226 Hz): This test is an objective evaluation of middle ear function. CPT code: 44532   Right Ear: Type A, middle ear pressure and tympanic membrane compliance within normal limits.   Left Ear: Type A, middle ear pressure and tympanic membrane compliance within normal limits.     Acoustic Reflexes: This test is an objective measure of auditory and facial nerve pathways.   (Probe) Right Ear (ipsi right stimulus ear; contralateral left stimulus ear): (Ipsilateral) Responses absent at 500-4000 Hz. Artifact noted at each intensity and frequency.   (Probe) Left Ear (ipsi left stimulus ear; contralateral right stimulus ear):  (Ipsilateral) Responses present at expected levels for 500-4000 Hz.    Distortion Product Otoacoustic Emissions (DPOAE): This test is a measurement of responses which are generated by the cochlea when it is simultaneously stimulated by two pure tone frequencies. CPT code: 35592   Right Ear: Did not test.  Left Ear:  Did not test.  Present OAEs suggest normal or near cochlear outer hair cell function for corresponding frequency region(s). Absent OAEs with normal middle ear function can be consistent with some degree of hearing loss. Assessment of cochlear outer hair cell function may be impacted by outer or middle ear function.    Test technique: Conventional Audiometry via headphones. This test is an evaluation hearing sensitivity via air and bone conduction and speech recognition testing. CPT code: 54954  Reliability:  good    Pure Tone Audiometry:     Right Ear: Hearing sensitivity essentially within normal limits for 125-2000 Hz, sloping to mild  sensorineural hearing loss through 8000 Hz.  Left Ear: Hearing sensitivity essentially within normal limits for 125-2000 Hz, sloping to mild sensorineural hearing loss through 8000 Hz.    Speech Audiometry:   Right Ear: Speech Reception Threshold (SRT) was obtained at 20 dB HL. This is in good agreement with three frequency Pure Tone Average.   Word Recognition scores were excellent (96%) in quiet when words were presented at 60 dB HL. These results are based on [facility] Auditory Test No.6 (NU-6) Ordered by difficulty (N=10).  Left Ear:  Speech Reception Threshold (SRT) was obtained at 15 dB HL. This is in good agreement with three frequency Pure Tone Average.   Word Recognition scores were excellent (96%) in quiet when words were presented at 60 dB HL. These results are based on [facility] Auditory Test No.6 (NU-6) Ordered by difficulty (N=10).       Comparison of today's results with previous test results:  Slightly progressive in the high frequencies since last evaluation on 07/14/2021.          AMINA Hall, CCC-A  Licensed Clinical Audiologist  Subdivision Lead Audiologist - [facility]     Degree of   Hearing Sensitivity dB Range   Within Normal Limits (WNL) 0 - 20   Slight 25   Mild 26 - 40   Moderate 41 - 55   Moderately-Severe 56 - 70   Severe 71 - 90   Profound 91 +     Key   CHL Conductive Hearing Loss   ECV Ear Canal Volume   HA Hearing Aid   NIHL Noise-Induced Hearing Loss   PTA Pure Tone Average   SNHL Sensorineural Hearing Loss   TM Tympanic Membrane   WNL Within Normal Limits

## 2024-12-13 NOTE — PROGRESS NOTES
Subjective   Patient ID: Huseyin Saunders is a 79 y.o. male who presents for Cerumen Impaction (Bilateral Ht after visit ).  HPI  This patient presents for scheduled cerumen removal.  His last cleaning was in 2021 with KLEBER Mays CNP.  Today, he complains of right greater than left fullness and right greater than left hearing difficulty.  He does not wear hearing amplification.  He denies any history of otologic surgery or family history of hearing loss.  He endorses personal history of noise exposure in the .  He is accompanied today by his wife.  Review of Systems  A comprehensive or 10 points review of the patient's constitutional, neurological, HEENT, pulmonary, cardiovascular and genito-urinary systems showed only those mentioned in history of present illness.    Objective   Physical Exam  Constitutional: no fever, chills, weight loss or weight gain   General appearance: Appears well, well-nourished, well groomed. No acute distress.   Communication: Normal communication   Psychiatric: Oriented to person, place and time. Normal mood and affect.   Neurologic: Cranial nerves II-XII grossly intact and symmetric bilaterally.   Head and Face:   Head: Atraumatic with no masses, lesions or scarring.   Face: Normal symmetry, no paralysis, synkinesis or facial tic. No scars or deformities.     Eyes: Conjunctiva not edematous or erythematous   Ears: External inspection of ears with no deformity, scars or masses.  Bilateral canals narrow with cerumen impactions     Neck: Normal appearing, symmetric, trachea midline.   Cardiovascular: Examination of peripheral vascular system shows no clubbing or cyanosis.   Respiratory: No respiratory distress increased work of breathing. Inspection of the chest with symmetric chest expansion and normal respiratory effort.   Skin: No rashes in the head or neck      My interpretation of the audiogram done today after cleaning his normal hearing through 3000 Hz sloping to mild  sensorineural hearing loss with excellent word recognition scores and normal tympanograms bilaterally. Significant there is been progression of hearing loss in both ears at 4000 through 6000 Hz only when compared to his previous audiogram from 2021.  Assessment/Plan     This patient presents for initial evaluation of acute acquired bilateral cerumen impaction and right aural fullness as well as chronic bilateral sensorineural hearing loss.    Reassurance given that otologic exam is normal after cleaning, but his ear canals are quite narrow.  I recommended he return for cleaning in 6 months.  All questions were answered to patient and family's satisfaction.    This note was created using speech recognition transcription software. Despite proofreading, several typographical errors might be present that might affect the meaning of the content. Please call with any questions.  Patient ID: Huseyin Saunders is a 79 y.o. male.    Ear cerumen removal    Date/Time: 12/13/2024 10:38 AM    Performed by: LEE Gay  Authorized by: LEE Gay    Consent:     Consent obtained:  Verbal    Consent given by:  Patient    Risks discussed:  Pain    Alternatives discussed:  No treatment  Procedure details:     Location:  L ear and R ear    Procedure type comment:  Suction    Procedure outcomes: cerumen removed    Post-procedure details:     Inspection:  No bleeding, ear canal clear and TM intact    Hearing quality:  Improved    Procedure completion:  Tolerated well, no immediate complications  Comments:      Both canals are quite narrow and angle posteriorly.  Right canal is more narrow.         LEE Gay 12/13/24 10:35 AM

## 2025-01-15 DIAGNOSIS — I10 PRIMARY HYPERTENSION: ICD-10-CM

## 2025-01-16 RX ORDER — METOPROLOL SUCCINATE 200 MG/1
200 TABLET, EXTENDED RELEASE ORAL NIGHTLY
Qty: 90 TABLET | Refills: 3 | Status: SHIPPED | OUTPATIENT
Start: 2025-01-16

## 2025-01-23 DIAGNOSIS — I10 PRIMARY HYPERTENSION: ICD-10-CM

## 2025-01-24 RX ORDER — FUROSEMIDE 20 MG/1
20 TABLET ORAL DAILY
Qty: 30 TABLET | Refills: 5 | Status: SHIPPED | OUTPATIENT
Start: 2025-01-24

## 2025-01-27 DIAGNOSIS — I10 HYPERTENSION, UNSPECIFIED TYPE: ICD-10-CM

## 2025-01-28 RX ORDER — POTASSIUM CHLORIDE 20 MEQ/1
20 TABLET, EXTENDED RELEASE ORAL 3 TIMES DAILY
Qty: 270 TABLET | Refills: 1 | Status: SHIPPED | OUTPATIENT
Start: 2025-01-28

## 2025-02-07 DIAGNOSIS — M10.9 PODAGRA: ICD-10-CM

## 2025-02-07 DIAGNOSIS — I10 PRIMARY HYPERTENSION: ICD-10-CM

## 2025-02-07 DIAGNOSIS — I10 HYPERTENSION, UNSPECIFIED TYPE: ICD-10-CM

## 2025-02-07 RX ORDER — PROBENECID AND COLCHICINE .5; 5 MG/1; MG/1
TABLET ORAL
Qty: 90 TABLET | Refills: 1 | Status: SHIPPED | OUTPATIENT
Start: 2025-02-07

## 2025-02-07 RX ORDER — AMLODIPINE BESYLATE 10 MG/1
10 TABLET ORAL DAILY
Qty: 90 TABLET | Refills: 1 | Status: SHIPPED | OUTPATIENT
Start: 2025-02-07

## 2025-02-07 RX ORDER — CLONIDINE HYDROCHLORIDE 0.2 MG/1
0.2 TABLET ORAL EVERY 12 HOURS
Qty: 180 TABLET | Refills: 1 | Status: SHIPPED | OUTPATIENT
Start: 2025-02-07

## 2025-03-17 DIAGNOSIS — I10 HYPERTENSION, UNSPECIFIED TYPE: ICD-10-CM

## 2025-03-17 RX ORDER — LOSARTAN POTASSIUM 100 MG/1
100 TABLET ORAL DAILY
Qty: 90 TABLET | Refills: 0 | Status: SHIPPED | OUTPATIENT
Start: 2025-03-17

## 2025-06-09 ENCOUNTER — APPOINTMENT (OUTPATIENT)
Dept: PRIMARY CARE | Facility: CLINIC | Age: 80
End: 2025-06-09
Payer: MEDICARE

## 2025-06-13 ENCOUNTER — APPOINTMENT (OUTPATIENT)
Dept: OTOLARYNGOLOGY | Facility: CLINIC | Age: 80
End: 2025-06-13
Payer: MEDICARE

## 2025-06-13 DIAGNOSIS — I10 HYPERTENSION, UNSPECIFIED TYPE: ICD-10-CM

## 2025-06-13 RX ORDER — LOSARTAN POTASSIUM 100 MG/1
100 TABLET ORAL DAILY
Qty: 90 TABLET | Refills: 0 | Status: SHIPPED | OUTPATIENT
Start: 2025-06-13

## 2025-07-02 ENCOUNTER — APPOINTMENT (OUTPATIENT)
Dept: PRIMARY CARE | Facility: CLINIC | Age: 80
End: 2025-07-02
Payer: MEDICARE

## 2025-07-02 VITALS
HEART RATE: 51 BPM | DIASTOLIC BLOOD PRESSURE: 74 MMHG | BODY MASS INDEX: 30.61 KG/M2 | HEIGHT: 72 IN | WEIGHT: 226 LBS | SYSTOLIC BLOOD PRESSURE: 133 MMHG | OXYGEN SATURATION: 96 %

## 2025-07-02 DIAGNOSIS — N18.31 BENIGN HYPERTENSION WITH STAGE 3A CHRONIC KIDNEY DISEASE (MULTI): ICD-10-CM

## 2025-07-02 DIAGNOSIS — E21.3 HYPERPARATHYROIDISM (MULTI): ICD-10-CM

## 2025-07-02 DIAGNOSIS — Z74.09 IMPAIRED FUNCTIONAL MOBILITY, BALANCE, GAIT, AND ENDURANCE: Primary | ICD-10-CM

## 2025-07-02 DIAGNOSIS — I10 PRIMARY HYPERTENSION: ICD-10-CM

## 2025-07-02 DIAGNOSIS — Z00.00 ROUTINE GENERAL MEDICAL EXAMINATION AT HEALTH CARE FACILITY: ICD-10-CM

## 2025-07-02 DIAGNOSIS — R97.20 ELEVATED PSA: ICD-10-CM

## 2025-07-02 DIAGNOSIS — N18.32 CKD STAGE G3B/A1, GFR 30-44 AND ALBUMIN CREATININE RATIO <30 MG/G (MULTI): ICD-10-CM

## 2025-07-02 DIAGNOSIS — I69.351 HEMIPLEGIA AND HEMIPARESIS FOLLOWING CEREBRAL INFARCTION AFFECTING RIGHT DOMINANT SIDE (MULTI): ICD-10-CM

## 2025-07-02 DIAGNOSIS — R97.20 PSA ELEVATION: ICD-10-CM

## 2025-07-02 DIAGNOSIS — E83.52 HYPERCALCEMIA: ICD-10-CM

## 2025-07-02 DIAGNOSIS — Z00.00 HEALTH CARE MAINTENANCE: ICD-10-CM

## 2025-07-02 DIAGNOSIS — I10 HYPERTENSION, UNSPECIFIED TYPE: ICD-10-CM

## 2025-07-02 DIAGNOSIS — I12.9 BENIGN HYPERTENSION WITH STAGE 3A CHRONIC KIDNEY DISEASE (MULTI): ICD-10-CM

## 2025-07-02 PROCEDURE — G8433 SCR FOR DEP NOT CPT DOC RSN: HCPCS | Performed by: INTERNAL MEDICINE

## 2025-07-02 PROCEDURE — 3078F DIAST BP <80 MM HG: CPT | Performed by: INTERNAL MEDICINE

## 2025-07-02 PROCEDURE — 1170F FXNL STATUS ASSESSED: CPT | Performed by: INTERNAL MEDICINE

## 2025-07-02 PROCEDURE — 3075F SYST BP GE 130 - 139MM HG: CPT | Performed by: INTERNAL MEDICINE

## 2025-07-02 PROCEDURE — 1159F MED LIST DOCD IN RCRD: CPT | Performed by: INTERNAL MEDICINE

## 2025-07-02 PROCEDURE — 1123F ACP DISCUSS/DSCN MKR DOCD: CPT | Performed by: INTERNAL MEDICINE

## 2025-07-02 PROCEDURE — 1036F TOBACCO NON-USER: CPT | Performed by: INTERNAL MEDICINE

## 2025-07-02 PROCEDURE — G0439 PPPS, SUBSEQ VISIT: HCPCS | Performed by: INTERNAL MEDICINE

## 2025-07-02 PROCEDURE — 1160F RVW MEDS BY RX/DR IN RCRD: CPT | Performed by: INTERNAL MEDICINE

## 2025-07-02 RX ORDER — ATORVASTATIN CALCIUM 20 MG/1
20 TABLET, FILM COATED ORAL DAILY
Qty: 100 TABLET | Refills: 3 | Status: SHIPPED | OUTPATIENT
Start: 2025-07-02 | End: 2026-08-06

## 2025-07-02 RX ORDER — AMLODIPINE BESYLATE 5 MG/1
5 TABLET ORAL DAILY
Qty: 90 TABLET | Refills: 3 | Status: SHIPPED | OUTPATIENT
Start: 2025-07-02

## 2025-07-02 RX ORDER — FUROSEMIDE 40 MG/1
40 TABLET ORAL DAILY
Qty: 90 TABLET | Refills: 3 | Status: SHIPPED | OUTPATIENT
Start: 2025-07-02

## 2025-07-02 ASSESSMENT — ENCOUNTER SYMPTOMS
POLYDIPSIA: 0
SHORTNESS OF BREATH: 0
NAUSEA: 0
EYE PAIN: 0
BACK PAIN: 0
DYSPHORIC MOOD: 0
AGITATION: 0
FACIAL ASYMMETRY: 0
EYE ITCHING: 0
FEVER: 0
POLYPHAGIA: 0
VOMITING: 0
ABDOMINAL DISTENTION: 0
FATIGUE: 0
NERVOUS/ANXIOUS: 0
NUMBNESS: 0
CHOKING: 0
HYPERACTIVE: 0
LIGHT-HEADEDNESS: 0
JOINT SWELLING: 0
SPEECH DIFFICULTY: 0
DIAPHORESIS: 0
CONFUSION: 0
ARTHRALGIAS: 0
EYE DISCHARGE: 0
HEADACHES: 0
EYE REDNESS: 0
CHEST TIGHTNESS: 0
ACTIVITY CHANGE: 0
COUGH: 0
APPETITE CHANGE: 0
BLOOD IN STOOL: 0
DIFFICULTY URINATING: 0
FACIAL SWELLING: 0
ABDOMINAL PAIN: 0

## 2025-07-02 ASSESSMENT — PATIENT HEALTH QUESTIONNAIRE - PHQ9
1. LITTLE INTEREST OR PLEASURE IN DOING THINGS: NOT AT ALL
SUM OF ALL RESPONSES TO PHQ9 QUESTIONS 1 AND 2: 0
2. FEELING DOWN, DEPRESSED OR HOPELESS: NOT AT ALL

## 2025-07-02 ASSESSMENT — ACTIVITIES OF DAILY LIVING (ADL)
DOING_HOUSEWORK: INDEPENDENT
DRESSING: INDEPENDENT
MANAGING_FINANCES: INDEPENDENT
BATHING: INDEPENDENT
GROCERY_SHOPPING: INDEPENDENT
TAKING_MEDICATION: INDEPENDENT

## 2025-07-02 NOTE — ASSESSMENT & PLAN NOTE
Check PTH and ionized calcium.  Once again advised establishing with endocrinology but deferring at this time.  Increase Lasix

## 2025-07-02 NOTE — PROGRESS NOTES
Subjective   Reason for Visit: Huseyin Saunders is an 80 y.o. male here for a Medicare Wellness visit.     Past Medical, Surgical, and Family History reviewed and updated in chart.    Reviewed all medications by prescribing practitioner or clinical pharmacist (such as prescriptions, OTCs, herbal therapies and supplements) and documented in the medical record.    Past Medical, Surgical, and Family History reviewed and updated in chart.    Reviewed all medications by prescribing practitioner or clinical pharmacist (such as prescriptions, OTCs, herbal therapies and supplements) and documented in the medical record.        Patient Care Team:  Kenji Morris DO as PCP - General (Internal Medicine)  Kenji Morris DO as PCP - [facility] ACO Attributed Provider     HPI    Patient is a 80-year-old male with past medical history of BPH elevated PSA chronic kidney disease stage III hypertension external vascular accident dyslipidemia and likely primary hyperparathyroidism who presents for Medicare wellness.    With regards to his medical care patient is happy with where he is at and does not wish to escalate care.  He is aware of the fact that his calcium has been elevated historically and he has been referred to an endocrinologist for evaluation.  He may need surgical excision of his parathyroid glands however he is deferring that at this time.  He denies any lightheadedness or dizziness.  No recent falls.  He is not on any beta-blocker .    With regards to his gout he has had no recent gout flares.    BPH due for PSA recheck.  He is not on any BPH medications.    He lives at home with his wife.  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  His blood pressure is well-controlled at time but he takes for medications.    He follows with physical therapy and orthopedist and has an AFO being prepared.  He is currently walking with a walker.    His wife is at home and has multiple medical  issues but for the most part is assisting the patient with his ambulatory needs.    Patient Care Team:  Kenji Morris DO as PCP - General (Internal Medicine)  Kenji Morris DO as PCP - [facility]     Review of Systems   Constitutional:  Negative for activity change, appetite change, diaphoresis, fatigue and fever.   HENT:  Negative for dental problem, drooling, ear pain, facial swelling, hearing loss and nosebleeds.    Eyes:  Negative for pain, discharge, redness and itching.   Respiratory:  Negative for cough, choking, chest tightness and shortness of breath.    Gastrointestinal:  Negative for abdominal distention, abdominal pain, blood in stool, nausea and vomiting.   Endocrine: Negative for cold intolerance, heat intolerance, polydipsia and polyphagia.   Genitourinary:  Negative for difficulty urinating.   Musculoskeletal:  Negative for arthralgias, back pain and joint swelling.   Allergic/Immunologic: Negative for environmental allergies and food allergies.   Neurological:  Negative for facial asymmetry, speech difficulty, light-headedness, numbness and headaches.   Psychiatric/Behavioral:  Negative for agitation, confusion and dysphoric mood. The patient is not nervous/anxious and is not hyperactive.        Objective   Vitals:  /74   Pulse 51   Ht 1.829 m (6')   Wt 103 kg (226 lb)   SpO2 96%   BMI 30.65 kg/m²       Physical Exam  Constitutional:       Appearance: Normal appearance.   HENT:      Head: Normocephalic and atraumatic.      Nose: No congestion or rhinorrhea.      Mouth/Throat:      Mouth: Mucous membranes are moist.   Eyes:      Pupils: Pupils are equal, round, and reactive to light.   Cardiovascular:      Rate and Rhythm: Normal rate and regular rhythm.      Pulses: Normal pulses.      Heart sounds: Normal heart sounds. No murmur heard.     No friction rub. No gallop.   Pulmonary:      Effort: Pulmonary effort is normal. No respiratory distress.      Breath sounds:  Normal breath sounds. No stridor. No wheezing or rales.   Abdominal:      General: Abdomen is flat. Bowel sounds are normal. There is no distension.      Palpations: Abdomen is soft. There is no mass.      Tenderness: There is no guarding.      Hernia: No hernia is present.   Musculoskeletal:         General: No swelling.      Cervical back: No rigidity.      Right lower leg: No edema.      Left lower leg: No edema.      Comments: Weakness of the left lower extremity in the left upper extremity.  Walking with a cane   Skin:     General: Skin is warm and dry.   Neurological:      General: No focal deficit present.      Mental Status: He is alert and oriented to person, place, and time. Mental status is at baseline.      Motor: No weakness.      Gait: Gait normal.   Psychiatric:         Mood and Affect: Mood normal.         Behavior: Behavior normal.         Thought Content: Thought content normal.         Assessment/Plan   Problem List Items Addressed This Visit       Hypercalcemia    Relevant Orders    Disability Placard    PTH, intact    Calcium, ionized     Other Visit Diagnoses         Impaired functional mobility, balance, gait, and endurance    -  Primary    Relevant Orders    Disability Placard      Routine general medical examination at health care facility        Relevant Medications    atorvastatin (Lipitor) 20 mg tablet      PSA elevation        Relevant Orders    PSA, total and free      Health care maintenance        Relevant Orders    CBC    Comprehensive metabolic panel      Hypertension, unspecified type        Relevant Orders    CBC      Primary hypertension        Relevant Medications    furosemide (Lasix) 40 mg tablet    amLODIPine (Norvasc) 5 mg tablet

## 2025-07-03 LAB
ALBUMIN SERPL-MCNC: 4.3 G/DL (ref 3.6–5.1)
ALP SERPL-CCNC: 89 U/L (ref 35–144)
ALT SERPL-CCNC: 11 U/L (ref 9–46)
ANION GAP SERPL CALCULATED.4IONS-SCNC: 10 MMOL/L (CALC) (ref 7–17)
AST SERPL-CCNC: 14 U/L (ref 10–35)
BILIRUB SERPL-MCNC: 0.9 MG/DL (ref 0.2–1.2)
BUN SERPL-MCNC: 20 MG/DL (ref 7–25)
CA-I SERPL-MCNC: 5.8 MG/DL (ref 4.7–5.5)
CALCIUM SERPL-MCNC: 10.4 MG/DL (ref 8.6–10.3)
CHLORIDE SERPL-SCNC: 104 MMOL/L (ref 98–110)
CO2 SERPL-SCNC: 23 MMOL/L (ref 20–32)
CREAT SERPL-MCNC: 1.36 MG/DL (ref 0.7–1.28)
EGFRCR SERPLBLD CKD-EPI 2021: 53 ML/MIN/1.73M2
ERYTHROCYTE [DISTWIDTH] IN BLOOD BY AUTOMATED COUNT: 13.2 % (ref 11–15)
GLUCOSE SERPL-MCNC: 102 MG/DL (ref 65–99)
HCT VFR BLD AUTO: 47.7 % (ref 38.5–50)
HGB BLD-MCNC: 15.9 G/DL (ref 13.2–17.1)
MCH RBC QN AUTO: 30.8 PG (ref 27–33)
MCHC RBC AUTO-ENTMCNC: 33.3 G/DL (ref 32–36)
MCV RBC AUTO: 92.3 FL (ref 80–100)
PLATELET # BLD AUTO: 196 THOUSAND/UL (ref 140–400)
PMV BLD REES-ECKER: 10.6 FL (ref 7.5–12.5)
POTASSIUM SERPL-SCNC: 4.5 MMOL/L (ref 3.5–5.3)
PROT SERPL-MCNC: 6.4 G/DL (ref 6.1–8.1)
PSA FREE MFR SERPL: 16 % (CALC)
PSA FREE SERPL-MCNC: 1.4 NG/ML
PSA SERPL-MCNC: 8.8 NG/ML
PTH-INTACT SERPL-MCNC: 238 PG/ML (ref 16–77)
RBC # BLD AUTO: 5.17 MILLION/UL (ref 4.2–5.8)
SODIUM SERPL-SCNC: 137 MMOL/L (ref 135–146)
WBC # BLD AUTO: 6.3 THOUSAND/UL (ref 3.8–10.8)

## 2025-07-26 DIAGNOSIS — I10 HYPERTENSION, UNSPECIFIED TYPE: ICD-10-CM

## 2025-07-28 RX ORDER — POTASSIUM CHLORIDE 20 MEQ/1
TABLET, EXTENDED RELEASE ORAL
Qty: 270 TABLET | Refills: 0 | Status: SHIPPED | OUTPATIENT
Start: 2025-07-28

## 2025-07-29 DIAGNOSIS — I10 HYPERTENSION, UNSPECIFIED TYPE: ICD-10-CM

## 2025-07-29 RX ORDER — POTASSIUM CHLORIDE 20 MEQ/1
TABLET, EXTENDED RELEASE ORAL
Qty: 270 TABLET | Refills: 0 | OUTPATIENT
Start: 2025-07-29

## 2025-08-05 DIAGNOSIS — I10 HYPERTENSION, UNSPECIFIED TYPE: ICD-10-CM

## 2025-08-05 DIAGNOSIS — M10.9 PODAGRA: ICD-10-CM

## 2025-08-05 RX ORDER — PROBENECID AND COLCHICINE .5; 5 MG/1; MG/1
TABLET ORAL
Qty: 90 TABLET | Refills: 1 | Status: SHIPPED | OUTPATIENT
Start: 2025-08-05

## 2025-08-05 RX ORDER — CLONIDINE HYDROCHLORIDE 0.2 MG/1
0.2 TABLET ORAL EVERY 12 HOURS
Qty: 180 TABLET | Refills: 1 | Status: SHIPPED | OUTPATIENT
Start: 2025-08-05

## 2026-01-06 ENCOUNTER — APPOINTMENT (OUTPATIENT)
Dept: PRIMARY CARE | Facility: CLINIC | Age: 81
End: 2026-01-06
Payer: MEDICARE